# Patient Record
Sex: FEMALE | Race: WHITE
[De-identification: names, ages, dates, MRNs, and addresses within clinical notes are randomized per-mention and may not be internally consistent; named-entity substitution may affect disease eponyms.]

---

## 2017-12-14 ENCOUNTER — HOSPITAL ENCOUNTER (EMERGENCY)
Dept: HOSPITAL 25 - ED | Age: 42
Discharge: HOME | End: 2017-12-14
Payer: MEDICARE

## 2017-12-14 DIAGNOSIS — R11.0: ICD-10-CM

## 2017-12-14 DIAGNOSIS — R10.9: Primary | ICD-10-CM

## 2017-12-14 LAB
BASOPHILS # BLD AUTO: 0.2 10^3/UL (ref 0–0.2)
EOSINOPHIL # BLD AUTO: 0.1 10^3/UL (ref 0–0.6)
HCT VFR BLD AUTO: 41 % (ref 35–47)
HGB BLD-MCNC: 14 G/DL (ref 12–16)
LYMPHOCYTES # BLD AUTO: 2.8 10^3/UL (ref 1–4.8)
MCH RBC QN AUTO: 30 PG (ref 27–31)
MCHC RBC AUTO-ENTMCNC: 34 G/DL (ref 31–36)
MCV RBC AUTO: 87 FL (ref 80–97)
MONOCYTES # BLD AUTO: 0.4 10^3/UL (ref 0–0.8)
NEUTROPHILS # BLD AUTO: 3.9 10^3/UL (ref 1.5–7.7)
NRBC # BLD AUTO: 0 10^3/UL
NRBC BLD QL AUTO: 0.1
PLATELET # BLD AUTO: 250 10^3/UL (ref 150–450)
RBC # BLD AUTO: 4.73 10^6/UL (ref 4–5.4)
WBC # BLD AUTO: 7.4 10^3/UL (ref 3.5–10.8)

## 2017-12-14 PROCEDURE — 83690 ASSAY OF LIPASE: CPT

## 2017-12-14 PROCEDURE — 99282 EMERGENCY DEPT VISIT SF MDM: CPT

## 2017-12-14 PROCEDURE — 85025 COMPLETE CBC W/AUTO DIFF WBC: CPT

## 2017-12-14 PROCEDURE — 36415 COLL VENOUS BLD VENIPUNCTURE: CPT

## 2017-12-14 PROCEDURE — 80053 COMPREHEN METABOLIC PANEL: CPT

## 2017-12-14 PROCEDURE — 74020: CPT

## 2017-12-14 NOTE — RAD
Indication: Chronic increasing abdominal pain.



Comparison: July 20, 2005



Technique: Supine and upright views of the abdomen.



Report: Obese body habitus limits image quality.



No radiographic evidence for free air.



Unremarkable bowel gas pattern. Small volume of stool in the colon without significant

rectal distension.



Negative for suspicious calcifications.



Unremarkable soft tissue contours accounting for obese body habitus.



IMPRESSION:  No acute abdominal pelvic pathologic process evident.

## 2018-01-04 NOTE — ED
Christiana MARRERO Edward, scribed for Osman Cooper MD on 12/14/17 at 1326 .





Abdominal Pain/Female





- HPI Summary


HPI Summary: 





41 y/o female presents to the ED c/o severe ABD pain for weeks. The pain is 

described as a stabbing pain. The pain is located in the RLQ and radiates to 

the upper ABD. When the pt stands up the pain is aggravated. Associated sx: 

nausea, chills, numbness in her hands, bilateral pedal edema. Pt states the 

last time she passed gas was around 2 nights ago. Denies fever. PMHx bipolar 

disorder, PTSD. Sx 2 C-sections. Pt was seen at [facility] recently and had an 

ABD CT done. Pt states it showed kidney stones and multiple ABD hernias.





- History of Current Complaint


Chief Complaint: EDAbdPain


Stated Complaint: ABD PAIN


Hx Obtained From: Patient


Timing: Constant


Severity Currently: Severe


Pain Intensity: 8


Pain Scale Used: 0-10 Numeric


Location: Discrete At: RLQ


Radiates: Yes


Radiates to: Other - upper ABD


Character: Other: - stabbing


Aggravating Factor(s): Food, Movement - standing up


Alleviating Factor(s): Nothing


Associated Signs and Symptoms: Positive: Decreased Appetite, Nausea, Other: - 

numbness in hands, edema in feet


Allergies/Adverse Reactions: 


 Allergies











Allergy/AdvReac Type Severity Reaction Status Date / Time


 


No Known Allergies Allergy   Verified 12/14/17 13:44














PMH/Surg Hx/FS Hx/Imm Hx


Previously Healthy: No


Neurological History: 


   Denies: Hx Seizures


Psychiatric History: Reports: Hx Post Traumatic Stress Disorder, Hx Bipolar 

Disorder





- Immunization History


Date of Tetanus Vaccine: UTD


Date of Influenza Vaccine: 10/2017


Infectious Disease History: No


Infectious Disease History: 


   Denies: Traveled Outside the [place] in Last 30 Days





- Family History


Known Family History: 


   Negative: Cardiac Disease, Hypertension, Diabetes





- Social History


Lives: With Family


Alcohol Use: Occasionally


Hx Substance Use: No


Substance Use Type: Reports: None, Prescribed


Hx Tobacco Use: No


Smoking Status (MU): Never Smoked Tobacco





Review of Systems


Positive: Chills


Eyes: Negative


ENT: Negative


Cardiovascular: Negative


Respiratory: Negative


Positive: Abdominal Pain, Nausea


Genitourinary: Negative


Positive: Edema - in feet


Skin: Negative


Positive: Numbness - in hands


Psychological: Normal


All Other Systems Reviewed And Are Negative: Yes





Physical Exam





- Summary


Physical Exam Summary: 





Appearance: Well-appearing, Well-nourished


Skin: Warm, Dry, No rash


Eyes: Normal, PERRL, EOMI, sclera anicteric


ENT: Normal


Neck: Supple, nontender


Respiratory: Clear to auscultation


Cardiovascular: S1, S2, no murmur, no rub, no gallop


Abdomen: Soft, nontender, no organomegaly


Bowel sounds: Present


Musculoskeletal: Normal, Strength/ROM Intact, no edema, pulses symmetrical


Neurological: Normal, A&Ox3, cranial nerves II-XII WNL, follows commands, gait 

not tested, sensation intact to pin and light touch


Psychiatric: affect normal, behavior appropriate, dressed appropriately, 

judgment intact





Triage Information Reviewed: Yes


Vital Signs On Initial Exam: 


 Initial Vitals











Temp Pulse Resp BP Pulse Ox


 


 97.4 F   84   22   142/89   97 


 


 12/14/17 12:56  12/14/17 12:56  12/14/17 12:56  12/14/17 12:56  12/14/17 12:56











Vital Signs Reviewed: Yes





- Teressa Coma Scale


Coma Scale Total: 15





Diagnostics





- Vital Signs


 Vital Signs











  Temp Pulse Resp BP Pulse Ox


 


 12/14/17 12:56  97.4 F  84  22  142/89  97














- Laboratory


Result Diagrams: 


 12/14/17 14:28





 12/14/17 14:28


Lab Statement: Any lab studies that have been ordered have been reviewed, and 

results considered in the medical decision making process.





- Radiology


  ** ABD XR


Xray Interpretation: No Acute Changes - No acute abdominal pelvic pathologic 

process evident.


Radiology Interpretation Completed By: Radiologist - ED PHYSICIAN REVIEWS AND 

AGREES





Abdominal Pain Fem Course/Dx





- Course


Course Of Treatment: ABD XR NEGATIVE. LABS WITHOUT SIGNIFICANT ABNORMALITIES. 

PT WILL BE D/C HOME.





- Diagnoses


Provider Diagnoses: 


 Nonspecific abdominal pain








Discharge





- Discharge Plan


Condition: Stable


Disposition: HOME


Patient Education Materials:  Abdominal Pain (ED)


Referrals: 


[facility] PHYSICIAN REFERRAL [Outside]





The documentation as recorded by the Christiana martins Edward accurately reflects the 

service I personally performed and the decisions made by me, Osman Cooper MD.

## 2019-12-27 ENCOUNTER — HOSPITAL ENCOUNTER (INPATIENT)
Dept: HOSPITAL 25 - MED | Age: 44
LOS: 6 days | Discharge: HOME | DRG: 872 | End: 2020-01-02
Attending: INTERNAL MEDICINE | Admitting: INTERNAL MEDICINE
Payer: MEDICARE

## 2019-12-27 DIAGNOSIS — E66.9: ICD-10-CM

## 2019-12-27 DIAGNOSIS — E87.1: ICD-10-CM

## 2019-12-27 DIAGNOSIS — L03.116: ICD-10-CM

## 2019-12-27 DIAGNOSIS — D50.0: ICD-10-CM

## 2019-12-27 DIAGNOSIS — Z79.899: ICD-10-CM

## 2019-12-27 DIAGNOSIS — Z99.89: ICD-10-CM

## 2019-12-27 DIAGNOSIS — F31.9: ICD-10-CM

## 2019-12-27 DIAGNOSIS — F43.10: ICD-10-CM

## 2019-12-27 DIAGNOSIS — F42.9: ICD-10-CM

## 2019-12-27 DIAGNOSIS — E87.6: ICD-10-CM

## 2019-12-27 DIAGNOSIS — A41.9: Primary | ICD-10-CM

## 2019-12-27 DIAGNOSIS — F60.3: ICD-10-CM

## 2019-12-27 DIAGNOSIS — L97.429: ICD-10-CM

## 2019-12-27 DIAGNOSIS — G47.30: ICD-10-CM

## 2019-12-27 DIAGNOSIS — R06.02: ICD-10-CM

## 2019-12-27 DIAGNOSIS — R60.0: ICD-10-CM

## 2019-12-27 DIAGNOSIS — G62.9: ICD-10-CM

## 2019-12-27 LAB
ANION GAP SERPL CALC-SCNC: 9 MMOL/L (ref 2–11)
BASOPHILS # BLD AUTO: 0 10^3/UL (ref 0–0.2)
BUN SERPL-MCNC: 9 MG/DL (ref 6–24)
BUN/CREAT SERPL: 13.2 (ref 8–20)
CALCIUM SERPL-MCNC: 6.9 MG/DL (ref 8.6–10.3)
CHLORIDE SERPL-SCNC: 101 MMOL/L (ref 101–111)
EOSINOPHIL # BLD AUTO: 0.1 10^3/UL (ref 0–0.6)
GLUCOSE SERPL-MCNC: 133 MG/DL (ref 70–100)
HCO3 SERPL-SCNC: 22 MMOL/L (ref 22–32)
HCT VFR BLD AUTO: 34 % (ref 35–47)
HGB BLD-MCNC: 11.7 G/DL (ref 12–16)
LYMPHOCYTES # BLD AUTO: 0.5 10^3/UL (ref 1–4.8)
MCH RBC QN AUTO: 31 PG (ref 27–31)
MCHC RBC AUTO-ENTMCNC: 34 G/DL (ref 31–36)
MCV RBC AUTO: 90 FL (ref 80–97)
MONOCYTES # BLD AUTO: 0.2 10^3/UL (ref 0–0.8)
NEUTROPHILS # BLD AUTO: 10.3 10^3/UL (ref 1.5–7.7)
NRBC # BLD AUTO: 0 10^3/UL
NRBC BLD QL AUTO: 0
PLATELET # BLD AUTO: 140 10^3/UL (ref 150–450)
POTASSIUM SERPL-SCNC: 3.3 MMOL/L (ref 3.5–5)
RBC # BLD AUTO: 3.81 10^6 /UL (ref 3.7–4.87)
SODIUM SERPL-SCNC: 132 MMOL/L (ref 135–145)
WBC # BLD AUTO: 11.1 10^3/UL (ref 3.5–10.8)

## 2019-12-27 PROCEDURE — 82565 ASSAY OF CREATININE: CPT

## 2019-12-27 PROCEDURE — 82728 ASSAY OF FERRITIN: CPT

## 2019-12-27 PROCEDURE — 86140 C-REACTIVE PROTEIN: CPT

## 2019-12-27 PROCEDURE — 36415 COLL VENOUS BLD VENIPUNCTURE: CPT

## 2019-12-27 PROCEDURE — 83605 ASSAY OF LACTIC ACID: CPT

## 2019-12-27 PROCEDURE — 83550 IRON BINDING TEST: CPT

## 2019-12-27 PROCEDURE — 80202 ASSAY OF VANCOMYCIN: CPT

## 2019-12-27 PROCEDURE — 94660 CPAP INITIATION&MGMT: CPT

## 2019-12-27 PROCEDURE — 85025 COMPLETE CBC W/AUTO DIFF WBC: CPT

## 2019-12-27 PROCEDURE — 82550 ASSAY OF CK (CPK): CPT

## 2019-12-27 PROCEDURE — 87205 SMEAR GRAM STAIN: CPT

## 2019-12-27 PROCEDURE — 82803 BLOOD GASES ANY COMBINATION: CPT

## 2019-12-27 PROCEDURE — C8929 TTE W OR WO FOL WCON,DOPPLER: HCPCS

## 2019-12-27 PROCEDURE — 93306 TTE W/DOPPLER COMPLETE: CPT

## 2019-12-27 PROCEDURE — 80048 BASIC METABOLIC PNL TOTAL CA: CPT

## 2019-12-27 PROCEDURE — 87040 BLOOD CULTURE FOR BACTERIA: CPT

## 2019-12-27 PROCEDURE — 82607 VITAMIN B-12: CPT

## 2019-12-27 PROCEDURE — 83036 HEMOGLOBIN GLYCOSYLATED A1C: CPT

## 2019-12-27 PROCEDURE — 85027 COMPLETE CBC AUTOMATED: CPT

## 2019-12-27 PROCEDURE — 80053 COMPREHEN METABOLIC PANEL: CPT

## 2019-12-27 PROCEDURE — 36600 WITHDRAWAL OF ARTERIAL BLOOD: CPT

## 2019-12-27 PROCEDURE — 82272 OCCULT BLD FECES 1-3 TESTS: CPT

## 2019-12-27 PROCEDURE — 87070 CULTURE OTHR SPECIMN AEROBIC: CPT

## 2019-12-27 PROCEDURE — 83540 ASSAY OF IRON: CPT

## 2019-12-27 PROCEDURE — 84520 ASSAY OF UREA NITROGEN: CPT

## 2019-12-27 PROCEDURE — 82746 ASSAY OF FOLIC ACID SERUM: CPT

## 2019-12-27 RX ADMIN — TRAMADOL HYDROCHLORIDE PRN MG: 50 TABLET, FILM COATED ORAL at 14:53

## 2019-12-27 RX ADMIN — SODIUM CHLORIDE SCH MLS/HR: 900 IRRIGANT IRRIGATION at 20:25

## 2019-12-27 RX ADMIN — PANTOPRAZOLE SODIUM SCH: 40 TABLET, DELAYED RELEASE ORAL at 17:30

## 2019-12-27 RX ADMIN — SODIUM CHLORIDE SCH MLS/HR: 900 IRRIGANT IRRIGATION at 14:38

## 2019-12-27 RX ADMIN — PIPERACILLIN AND TAZOBACTAM SCH MLS/HR: 3; .375 INJECTION, POWDER, LYOPHILIZED, FOR SOLUTION INTRAVENOUS; PARENTERAL at 20:26

## 2019-12-27 RX ADMIN — CLONAZEPAM SCH MG: 1 TABLET ORAL at 21:22

## 2019-12-27 RX ADMIN — ACETAMINOPHEN PRN MG: 325 TABLET ORAL at 14:38

## 2019-12-27 RX ADMIN — TRAZODONE HYDROCHLORIDE SCH MG: 50 TABLET ORAL at 21:24

## 2019-12-27 RX ADMIN — CALCIUM SCH MG: 500 TABLET ORAL at 21:24

## 2019-12-27 RX ADMIN — ENOXAPARIN SODIUM SCH MG: 40 INJECTION SUBCUTANEOUS at 14:38

## 2019-12-27 RX ADMIN — VANCOMYCIN HYDROCHLORIDE SCH MLS/HR: 1 INJECTION, POWDER, LYOPHILIZED, FOR SOLUTION INTRAVENOUS at 16:33

## 2019-12-27 RX ADMIN — ESCITALOPRAM OXALATE SCH MG: 10 TABLET ORAL at 21:25

## 2019-12-27 NOTE — HP
HISTORY AND PHYSICAL:

 

DATE OF ADMISSION:  12/27/19

 

PRIMARY CARE PROVIDER:  None.

 

REASON FOR THE ADMISSION:  Cellulitis, sent from [facility].

 

HISTORY OF PRESENT ILLNESS:  This is a 44-year-old female, who presented to the 
hospital from [facility] for the treatment of left leg cellulitis.  
About 4 days ago, the patient started to feel sick, which she describes as fever
, malaise, and generalized fatigue.  Shortly thereafter, she noted that she is 
having a left leg blistering, after which she noted severe erythema, swelling, 
and pain.  This was also associated with subjective fevers at home.  She went 
to the emergency room at [facility] and was found to have fever of 102.2, lactic 
acid of 4.1, WBC count of 11.82 and a lactic acid of 4.1.  She was admitted 
with a diagnosis of left leg cellulitis and started on Zosyn.  This morning, 
they noted that the infection had worsened and started her on vancomycin, and 
called the transfer center for transfer to [facility].  Currently, 
the patient is in our hospital, admitted, reports that her left leg is getting 
worse, associated with more pain and erythema, and more blisters.  She is also 
feeling febrile with chills.

 

PAST MEDICAL HISTORY:  Includes PTSD, bipolar disorder, OCD, borderline 
personality disorder.

 

PAST SURGICAL HISTORY:  Includes ventral hernia surgery.

 

ALLERGIES:  MORPHINE.

 

HOME MEDICATIONS:  Include:

1.  Clonazepam 2 mg at bedtime.

2.  Escitalopram 10 mg at bedtime.

3.  Oxcarbazepine 150 mg daily.

4.  Trazodone 50 mg at bedtime.

 

FAMILY HISTORY:  Mother had cancer.

 

SOCIAL HISTORY:  She lives at home with her 2 kids.  She does not smoke, no 
alcohol use.

 

REVIEW OF SYSTEMS:  She is having fever, chills, malaise.  She does not have 
any sore throat.  No changes in her vision or hearing.  She does not have any 
chest pain.  No palpitations.  No paroxysmal nocturnal dyspnea.  She does not 
have any shortness of breath.  No cough.  She does not have any abdominal pain.
  No nausea, no vomiting, no diarrhea.  Extremities:  See HPI.  Skin:  See HPI.
  She does not have any headaches, no changes in her mentation.  She does have 
a history of PTSD, bipolar disorder with no acute changes.  She does not have 
any thoughts of hurting herself or anyone else.

 

                               PHYSICAL EXAMINATION

 

GENERAL:  This is an obese female, well developed, well nourished, lying in bed
, in no acute distress.

 

VITAL SIGNS:  Blood pressure here is 132/88, heart rate of 118, temperature of 
102.1, respiratory rate of 14, saturation of 91% on on room air.

 

HEENT:  Pupils are equal, round, and reactive.  Atraumatic, normocephalic.  
There is no oropharyngeal erythema, no tonsillar exudate.

 

NECK:  There is no cervical lymphadenopathy.  There is no thyromegaly.

 

LUNGS:  There is no lower extremity edema.  Diminished breath sounds at the 
lung bases, with no wheezing, rales or rhonchi.

 

HEART:  There is no chest wall tenderness, regular tachycardia with no murmurs, 
rubs or gallops.

 

ABDOMEN:  Surgical scar at the vertical midline noted.  There is normoactive 
bowel sounds.  Abdomen is soft, nontender, nondistended.

 

EXTREMITIES:  There is no right lower extremity edema.  Left lower extremity: 
There is a marker-line drawn, which contains erythema up to the knee area.  
There is marked erythema with mild swelling and tenderness present with few 
blisters present.  There is also erythema at the left groin.

 

NEUROLOGIC:  She is awake, alert, oriented x3 with no focal deficit.

 

 DIAGNOSTIC STUDIES/LAB DATA:  Labs:  White blood cell count of 11.1, 
hemoglobin of 11.7, hematocrit of 34, platelets of 140.  Absolute neutrophil 
count of 10.3, lactic acid of 1.6.  Sodium of 132, potassium of 3.3, chloride 
of 101, bicarb of 22, BUN of 9, creatinine of 0.068.

 

IMPRESSION AND PLAN:

1.  Sepsis with left lower extremity cellulitis.  At this point, I have started 
the patient on vancomycin, Zosyn.  We will obtain blood cultures.  Start the 
patient on IV fluids.  I have ordered left lower extremity Doppler's to rule 
out DVT.  Pain control has been ordered with acetaminophen as well as tramadol 
as needed.

2.  Anemia.  Hemoglobin of 11.7 with MCV of 90.  This could be heme dilutional, 
repeat blood work will be ordered, I will also start the patient on 
pantoprazole for precautionary purposes.

3.  Hypokalemia.  Repletion has been ordered with 40 mEq of KCl.

4.  Mild hyponatremia with sodium of 133.  This is going to be replenished with 
normal saline.

5.  History of borderline personality disorder, posttraumatic stress disorder, 
bipolar disorder, obsessive-compulsive disorder.  Resume her home medication.

6.  DVT prophylaxis with Lovenox 40 mg subcu.

 

Additional management into the course as the hospital course progresses.

 

 

 

[id number]/CPS #: [id number]

MTDD

## 2019-12-28 LAB
ALBUMIN SERPL BCG-MCNC: 3 G/DL (ref 3.2–5.2)
ALBUMIN SERPL BCG-MCNC: 3 G/DL (ref 3.2–5.2)
ALBUMIN/GLOB SERPL: 1.1 {RATIO} (ref 1–3)
ALBUMIN/GLOB SERPL: 1.1 {RATIO} (ref 1–3)
ALP SERPL-CCNC: 48 U/L (ref 34–104)
ALP SERPL-CCNC: 49 U/L (ref 34–104)
ALT SERPL W P-5'-P-CCNC: 25 U/L (ref 7–52)
ALT SERPL W P-5'-P-CCNC: 27 U/L (ref 7–52)
ANION GAP SERPL CALC-SCNC: 7 MMOL/L (ref 2–11)
ANION GAP SERPL CALC-SCNC: 8 MMOL/L (ref 2–11)
AST SERPL-CCNC: 27 U/L (ref 13–39)
AST SERPL-CCNC: 34 U/L (ref 13–39)
BASOPHILS # BLD AUTO: 0 10^3/UL (ref 0–0.2)
BUN SERPL-MCNC: 5 MG/DL (ref 6–24)
BUN SERPL-MCNC: 6 MG/DL (ref 6–24)
BUN/CREAT SERPL: 8.1 (ref 8–20)
BUN/CREAT SERPL: 9.4 (ref 8–20)
CALCIUM SERPL-MCNC: 7.1 MG/DL (ref 8.6–10.3)
CALCIUM SERPL-MCNC: 7.2 MG/DL (ref 8.6–10.3)
CHLORIDE SERPL-SCNC: 103 MMOL/L (ref 101–111)
CHLORIDE SERPL-SCNC: 104 MMOL/L (ref 101–111)
EOSINOPHIL # BLD AUTO: 0.1 10^3/UL (ref 0–0.6)
FERRITIN SERPL IA-MCNC: 476.3 NG/ML (ref 11–307)
FOLATE SERPL-MCNC: > 20 NG/ML (ref 3.99–?)
GLOBULIN SER CALC-MCNC: 2.8 G/DL (ref 2–4)
GLOBULIN SER CALC-MCNC: 2.8 G/DL (ref 2–4)
GLUCOSE SERPL-MCNC: 130 MG/DL (ref 70–100)
GLUCOSE SERPL-MCNC: 133 MG/DL (ref 70–100)
HCO3 SERPL-SCNC: 21 MMOL/L (ref 22–32)
HCO3 SERPL-SCNC: 23 MMOL/L (ref 22–32)
HCT VFR BLD AUTO: 33 % (ref 35–47)
HCT VFR BLD AUTO: 33 % (ref 35–47)
HGB BLD-MCNC: 11 G/DL (ref 12–16)
HGB BLD-MCNC: 11.3 G/DL (ref 12–16)
IRON SERPL-MCNC: < 20 UG/DL (ref 50–212)
LYMPHOCYTES # BLD AUTO: 0.6 10^3/UL (ref 1–4.8)
MCH RBC QN AUTO: 31 PG (ref 27–31)
MCH RBC QN AUTO: 31 PG (ref 27–31)
MCHC RBC AUTO-ENTMCNC: 34 G/DL (ref 31–36)
MCHC RBC AUTO-ENTMCNC: 34 G/DL (ref 31–36)
MCV RBC AUTO: 90 FL (ref 80–97)
MCV RBC AUTO: 91 FL (ref 80–97)
MONOCYTES # BLD AUTO: 0.2 10^3/UL (ref 0–0.8)
NEUTROPHILS # BLD AUTO: 8.2 10^3/UL (ref 1.5–7.7)
NRBC # BLD AUTO: 0 10^3/UL
NRBC BLD QL AUTO: 0
PLATELET # BLD AUTO: 127 10^3/UL (ref 150–450)
PLATELET # BLD AUTO: 130 10^3/UL (ref 150–450)
POTASSIUM SERPL-SCNC: 3.3 MMOL/L (ref 3.5–5)
POTASSIUM SERPL-SCNC: 3.5 MMOL/L (ref 3.5–5)
PROT SERPL-MCNC: 5.8 G/DL (ref 6.4–8.9)
PROT SERPL-MCNC: 5.8 G/DL (ref 6.4–8.9)
RBC # BLD AUTO: 3.58 10^6 /UL (ref 3.7–4.87)
RBC # BLD AUTO: 3.66 10^6 /UL (ref 3.7–4.87)
SODIUM SERPL-SCNC: 133 MMOL/L (ref 135–145)
SODIUM SERPL-SCNC: 133 MMOL/L (ref 135–145)
TIBC SERPL-MCNC: 256 MCG/DL (ref 250–450)
TRANSFERRIN SERPL-MCNC: 183 MG/DL (ref 203–362)
WBC # BLD AUTO: 9.2 10^3/UL (ref 3.5–10.8)
WBC # BLD AUTO: 9.3 10^3/UL (ref 3.5–10.8)

## 2019-12-28 RX ADMIN — PIPERACILLIN AND TAZOBACTAM SCH MLS/HR: 3; .375 INJECTION, POWDER, LYOPHILIZED, FOR SOLUTION INTRAVENOUS; PARENTERAL at 20:02

## 2019-12-28 RX ADMIN — VANCOMYCIN HYDROCHLORIDE SCH MLS/HR: 1 INJECTION, POWDER, LYOPHILIZED, FOR SOLUTION INTRAVENOUS at 02:07

## 2019-12-28 RX ADMIN — ACETAMINOPHEN PRN MG: 325 TABLET ORAL at 08:04

## 2019-12-28 RX ADMIN — PIPERACILLIN AND TAZOBACTAM SCH MLS/HR: 3; .375 INJECTION, POWDER, LYOPHILIZED, FOR SOLUTION INTRAVENOUS; PARENTERAL at 12:21

## 2019-12-28 RX ADMIN — VANCOMYCIN HYDROCHLORIDE SCH MLS/HR: 1 INJECTION, POWDER, LYOPHILIZED, FOR SOLUTION INTRAVENOUS at 17:04

## 2019-12-28 RX ADMIN — TRAMADOL HYDROCHLORIDE PRN MG: 50 TABLET, FILM COATED ORAL at 00:49

## 2019-12-28 RX ADMIN — ESCITALOPRAM OXALATE SCH MG: 10 TABLET ORAL at 20:03

## 2019-12-28 RX ADMIN — VANCOMYCIN HYDROCHLORIDE SCH MLS/HR: 1 INJECTION, POWDER, LYOPHILIZED, FOR SOLUTION INTRAVENOUS at 08:04

## 2019-12-28 RX ADMIN — TRAZODONE HYDROCHLORIDE SCH MG: 50 TABLET ORAL at 20:03

## 2019-12-28 RX ADMIN — ENOXAPARIN SODIUM SCH MG: 40 INJECTION SUBCUTANEOUS at 15:22

## 2019-12-28 RX ADMIN — IBUPROFEN PRN MG: 400 TABLET ORAL at 17:15

## 2019-12-28 RX ADMIN — PIPERACILLIN AND TAZOBACTAM SCH MLS/HR: 3; .375 INJECTION, POWDER, LYOPHILIZED, FOR SOLUTION INTRAVENOUS; PARENTERAL at 04:02

## 2019-12-28 RX ADMIN — VANCOMYCIN HYDROCHLORIDE SCH MLS/HR: 1 INJECTION, POWDER, LYOPHILIZED, FOR SOLUTION INTRAVENOUS at 23:42

## 2019-12-28 RX ADMIN — ACETAMINOPHEN PRN MG: 325 TABLET ORAL at 00:50

## 2019-12-28 RX ADMIN — CALCIUM SCH MG: 500 TABLET ORAL at 08:05

## 2019-12-28 RX ADMIN — IRON SUCROSE SCH MLS/HR: 20 INJECTION, SOLUTION INTRAVENOUS at 12:21

## 2019-12-28 RX ADMIN — PANTOPRAZOLE SODIUM SCH MG: 40 TABLET, DELAYED RELEASE ORAL at 08:05

## 2019-12-28 RX ADMIN — CLONAZEPAM SCH MG: 1 TABLET ORAL at 20:02

## 2019-12-28 RX ADMIN — ACETAMINOPHEN PRN MG: 325 TABLET ORAL at 15:22

## 2019-12-28 RX ADMIN — OXCARBAZEPINE SCH MG: 300 TABLET, FILM COATED ORAL at 08:05

## 2019-12-28 NOTE — PN
Subjective


Date of Service: 12/28/19


Interval History: 





Nurse reports that she is having difficulty feeling the Left Dorsalis pedis 

pulse, but she was able to hear it with the dopplers.





Patient reports the erythema is imprving at the groin and inner thigh region, 

but the blisters are bigger at the lower leg and there is more swelling.





She reports bilateral feet numbness


Family History: Unchanged from Admission


Social History: Unchanged from Admission


Past Medical History: Unchanged from Admission





Objective


Active Medications: 








Acetaminophen (Tylenol Tab*)  650 mg PO Q6H PRN


   PRN Reason: PAIN - MODERATE


   Last Admin: 12/28/19 08:04 Dose:  650 mg


Clonazepam (Klonopin Tab(*))  2 mg PO BEDTIME [facility]


   Last Admin: 12/27/19 21:22 Dose:  2 mg


Enoxaparin Sodium (Lovenox(*))  40 mg SUBCUT Q24H [facility]


   Last Admin: 12/27/19 14:38 Dose:  40 mg


Escitalopram Oxalate (Lexapro *)  10 mg PO BEDTIME [facility]


   Last Admin: 12/27/19 21:25 Dose:  10 mg


Piperacillin Sod/Tazobactam (Sod 3.375 gm/ Sodium Chloride)  100 mls @ 25 mls/

hr IVPB Q8H [facility]


   Last Admin: 12/28/19 04:02 Dose:  25 mls/hr


Vancomycin HCl 1,250 mg/ (Sodium Chloride)  250 mls @ 166.667 mls/hr IVPB Q8H 

[facility]


   Last Admin: 12/28/19 08:04 Dose:  166.667 mls/hr


Ondansetron HCl (Zofran Inj*)  4 mg IV Q6H PRN


   PRN Reason: NAUSEA


   Last Admin: 12/27/19 15:47 Dose:  4 mg


Oxcarbazepine (Trileptal Tab(*))  150 mg PO DAILY [facility]


   Last Admin: 12/28/19 08:05 Dose:  150 mg


Pantoprazole Sodium (Protonix Tab*)  40 mg PO DAILY [facility]


   Last Admin: 12/28/19 08:05 Dose:  40 mg


Pharmacy Consult (Vancomycin Per Pharmacy*)  1 note FOLLOW UP .VANC PER 

PHARMACY [facility]; Protocol


Pharmacy Profile Note (Vancomycin Trough Check)  1 note FOLLOW UP ONCE ONE


   Stop: 12/28/19 15:31


Tramadol HCl (Ultram*)  50 mg PO Q8H PRN


   PRN Reason: PAIN - SEVERE


   Last Admin: 12/28/19 00:49 Dose:  50 mg


Trazodone HCl (Desyrel Tab*)  50 mg PO BEDTIME [facility]


   Last Admin: 12/27/19 21:24 Dose:  50 mg








 Vital Signs - 8 hr











  12/28/19 12/28/19 12/28/19





  02:45 03:08 03:31


 


Temperature   99 F


 


Pulse Rate   98


 


Respiratory 20 20 20





Rate   


 


Blood Pressure   131/71





(mmHg)   


 


O2 Sat by Pulse   99





Oximetry   














  12/28/19 12/28/19





  06:00 08:00


 


Temperature 98.9 F 100.5 F


 


Pulse Rate 102 110


 


Respiratory 18 24





Rate  


 


Blood Pressure 124/60 120/71





(mmHg)  


 


O2 Sat by Pulse 95 94





Oximetry  











Oxygen Devices in Use Now: None


Appearance: Obese female lying in bed, ill appearing but not in distress


Eyes: PERRLA


Respiratory: - - diminshed breath sounds with mild crackles.


Cardiovascular: - - regular tachycardia, no murmurs.


Abdominal: NL Sounds; No Tenderness; No Distention, No Hepatosplenomegaly


Skin: - - 1+ edema bilateral lower extremities. Left inner thigh and groin with 

mild erythema, and the lower left leg there is severe erythmea with tenderness 

and bilsters present. DP diminshed at the Left leg. sensation to touch at 

bilateral feet is diminshed, but feet are warm with able to move the feet and 

wiggle toes. 


Neurological: Alert and Oriented x 3


Result Diagrams: 


 12/28/19 06:44





 12/28/19 06:44





Assess/Plan/Problems-Billing


Assessment: 











- Patient Problems


(1) Sepsis due to cellulitis


Current Visit: Yes   Status: Acute   Code(s): L03.90 - CELLULITIS, UNSPECIFIED; 

A41.9 - SEPSIS, UNSPECIFIED ORGANISM   SNOMED Code(s): 35698822


   Comment: Left leg cellulitis


Blood cultures were done at [facility]- called them today and they 

reported the cultures are negative thus far


continue IV vancomycin and zosyn


blood cultures were drawn here as well.


DVT was ruled out.


received IV fluids, now having crackles at the lungs and worsening lower 

extremity swelling- stop the IV fluids, one dose of bumex   





(2) Anemia, iron deficiency


Current Visit: Yes   Status: Acute   Code(s): D50.9 - IRON DEFICIENCY ANEMIA, 

UNSPECIFIED   SNOMED Code(s): 50593293


   Comment: Low hemoglobin.


folate and B12 are WNL


iron studies suggestive of iron deficiency


IV iron while inpatient, and then will discharge with oral iron 


at this point no acute bleeding


reports brown colored stool, will get stool for occult blood.   





(3) Hyponatremia


Current Visit: Yes   Status: Acute   Code(s): E87.1 - HYPO-OSMOLALITY AND 

HYPONATREMIA   SNOMED Code(s): 33441450


   Comment: mild hyponatremia


will monitor


received IV fluids, now findings suggestive of fluid overload, IV fluids 

stopped and giving 1mg IV of bumex   





(4) Hypokalemia


Current Visit: Yes   Status: Acute   Code(s): E87.6 - HYPOKALEMIA   SNOMED Code(

s): 73140095


   Comment: repleted   





(5) Obesity


Current Visit: Yes   Status: Acute   Code(s): E66.9 - OBESITY, UNSPECIFIED   

SNOMED Code(s): 927892137


   Comment: BMI of 49


will check A1c

## 2019-12-29 LAB
ANION GAP SERPL CALC-SCNC: 7 MMOL/L (ref 2–11)
BUN SERPL-MCNC: 5 MG/DL (ref 6–24)
BUN/CREAT SERPL: 10.6 (ref 8–20)
CALCIUM SERPL-MCNC: 6.8 MG/DL (ref 8.6–10.3)
CHLORIDE SERPL-SCNC: 111 MMOL/L (ref 101–111)
CK SERPL-CCNC: 50 U/L (ref 10–223)
GLUCOSE SERPL-MCNC: 115 MG/DL (ref 70–100)
HCO3 SERPL-SCNC: 22 MMOL/L (ref 22–32)
HCT VFR BLD AUTO: 27 % (ref 35–47)
HGB BLD-MCNC: 9.3 G/DL (ref 12–16)
MCH RBC QN AUTO: 31 PG (ref 27–31)
MCHC RBC AUTO-ENTMCNC: 34 G/DL (ref 31–36)
MCV RBC AUTO: 91 FL (ref 80–97)
PLATELET # BLD AUTO: 140 10^3/UL (ref 150–450)
POTASSIUM SERPL-SCNC: 2.9 MMOL/L (ref 3.5–5)
RBC # BLD AUTO: 3.02 10^6 /UL (ref 3.7–4.87)
SODIUM SERPL-SCNC: 140 MMOL/L (ref 135–145)
WBC # BLD AUTO: 8 10^3/UL (ref 3.5–10.8)

## 2019-12-29 RX ADMIN — ENOXAPARIN SODIUM SCH MG: 40 INJECTION SUBCUTANEOUS at 16:20

## 2019-12-29 RX ADMIN — IBUPROFEN PRN MG: 400 TABLET ORAL at 16:33

## 2019-12-29 RX ADMIN — TRAZODONE HYDROCHLORIDE SCH MG: 50 TABLET ORAL at 20:38

## 2019-12-29 RX ADMIN — ACETAMINOPHEN PRN MG: 325 TABLET ORAL at 08:14

## 2019-12-29 RX ADMIN — PIPERACILLIN AND TAZOBACTAM SCH MLS/HR: 3; .375 INJECTION, POWDER, LYOPHILIZED, FOR SOLUTION INTRAVENOUS; PARENTERAL at 04:53

## 2019-12-29 RX ADMIN — VANCOMYCIN HYDROCHLORIDE SCH MLS/HR: 1 INJECTION, POWDER, LYOPHILIZED, FOR SOLUTION INTRAVENOUS at 16:20

## 2019-12-29 RX ADMIN — Medication SCH TAB: at 20:38

## 2019-12-29 RX ADMIN — PIPERACILLIN AND TAZOBACTAM SCH MLS/HR: 3; .375 INJECTION, POWDER, LYOPHILIZED, FOR SOLUTION INTRAVENOUS; PARENTERAL at 12:39

## 2019-12-29 RX ADMIN — VANCOMYCIN HYDROCHLORIDE SCH MLS/HR: 1 INJECTION, POWDER, LYOPHILIZED, FOR SOLUTION INTRAVENOUS at 04:54

## 2019-12-29 RX ADMIN — PIPERACILLIN AND TAZOBACTAM SCH MLS/HR: 3; .375 INJECTION, POWDER, LYOPHILIZED, FOR SOLUTION INTRAVENOUS; PARENTERAL at 20:38

## 2019-12-29 RX ADMIN — VANCOMYCIN HYDROCHLORIDE SCH MLS/HR: 1 INJECTION, POWDER, LYOPHILIZED, FOR SOLUTION INTRAVENOUS at 23:06

## 2019-12-29 RX ADMIN — ESCITALOPRAM OXALATE SCH MG: 10 TABLET ORAL at 20:38

## 2019-12-29 RX ADMIN — OXCARBAZEPINE SCH MG: 300 TABLET, FILM COATED ORAL at 08:15

## 2019-12-29 RX ADMIN — PANTOPRAZOLE SODIUM SCH MG: 40 TABLET, DELAYED RELEASE ORAL at 08:15

## 2019-12-29 RX ADMIN — IRON SUCROSE SCH MLS/HR: 20 INJECTION, SOLUTION INTRAVENOUS at 08:14

## 2019-12-29 RX ADMIN — VANCOMYCIN HYDROCHLORIDE SCH MLS/HR: 1 INJECTION, POWDER, LYOPHILIZED, FOR SOLUTION INTRAVENOUS at 10:58

## 2019-12-29 RX ADMIN — CLONAZEPAM SCH MG: 1 TABLET ORAL at 20:38

## 2019-12-29 NOTE — PN
Subjective


Date of Service: 12/29/19


Interval History: 





No acute issues overnight


Had fever yesterday.


This morning seen and evaluated- reported no new issues


having occasional shortness of breath, does have pain at the left leg.


Family History: Unchanged from Admission


Social History: Unchanged from Admission


Past Medical History: Unchanged from Admission





Objective


Active Medications: 








Acetaminophen (Tylenol Tab*)  650 mg PO Q6H PRN


   PRN Reason: PAIN - MODERATE


   Last Admin: 12/29/19 08:14 Dose:  650 mg


Clonazepam (Klonopin Tab(*))  2 mg PO BEDTIME [facility]


   Last Admin: 12/28/19 20:02 Dose:  2 mg


Enoxaparin Sodium (Lovenox(*))  40 mg SUBCUT Q24H [facility]


   Last Admin: 12/28/19 15:22 Dose:  40 mg


Escitalopram Oxalate (Lexapro *)  10 mg PO BEDTIME [facility]


   Last Admin: 12/28/19 20:03 Dose:  10 mg


Piperacillin Sod/Tazobactam (Sod 3.375 gm/ Sodium Chloride)  100 mls @ 25 mls/

hr IVPB Q8H [facility]


   Last Admin: 12/29/19 04:53 Dose:  25 mls/hr


Iron Sucrose 200 mg/ Sodium (Chloride)  110 mls @ 110 mls/hr IVPB DAILY [facility]


   Stop: 01/01/20 09:59


   Last Admin: 12/29/19 08:14 Dose:  110 mls/hr


Vancomycin HCl 1,000 mg/ (Sodium Chloride)  250 mls @ 166.667 mls/hr IV Q6H [facility]


   Last Admin: 12/29/19 10:58 Dose:  166.667 mls/hr


Ibuprofen (Motrin Tab*)  400 mg PO Q6H PRN


   PRN Reason: MILD PAIN or TEMP > 100.4


   Last Admin: 12/28/19 17:15 Dose:  400 mg


Ondansetron HCl (Zofran Inj*)  4 mg IV Q6H PRN


   PRN Reason: NAUSEA


   Last Admin: 12/27/19 15:47 Dose:  4 mg


Oxcarbazepine (Trileptal Tab(*))  150 mg PO DAILY [facility]


   Last Admin: 12/29/19 08:15 Dose:  150 mg


Pantoprazole Sodium (Protonix Tab*)  40 mg PO DAILY [facility]


   Last Admin: 12/29/19 08:15 Dose:  40 mg


Pharmacy Consult (Vancomycin Per Pharmacy*)  1 note FOLLOW UP .VANC PER 

PHARMACY [facility]; Protocol


Pharmacy Consult (Vancomycin Random Level*)  1 note FOLLOW UP ONCE ONE


   Stop: 12/30/19 05:01


Potassium Chloride (Klor Con Er Tab*)  40 meq PO UC ONCE ONE


   Stop: 12/29/19 13:01


Tramadol HCl (Ultram*)  50 mg PO Q8H PRN


   PRN Reason: PAIN - SEVERE


   Last Admin: 12/28/19 00:49 Dose:  50 mg


Trazodone HCl (Desyrel Tab*)  50 mg PO BEDTIME [facility]


   Last Admin: 12/28/19 20:03 Dose:  50 mg








 Vital Signs - 8 hr











  12/29/19 12/29/19 12/29/19





  07:00 08:00 11:00


 


Temperature 98.7 F  98.5 F


 


Pulse Rate 103  95


 


Respiratory 18 18 20





Rate   


 


Blood Pressure 127/77  126/86





(mmHg)   


 


O2 Sat by Pulse 94  95





Oximetry   











Oxygen Devices in Use Now: None


Appearance: Obese female lying in bed, not in distress.


Ears/Nose/Mouth/Throat: Mucous Membranes Moist


Respiratory: Symmetrical Chest Expansion and Respiratory Effort, - - mild 

bibasilar wheezing.


Cardiovascular: NL Sounds; No Murmurs; No JVD, RRR


Extremities: - - 1+ edema bilateral lower extremities.


Skin: - - Left groin with mild erythema, moderate erythema and Left lower leg 

with blisters, swelling and tenderness. Dorsalis pedis 2+


Neurological: Alert and Oriented x 3, NL Muscle Strength and Tone


Result Diagrams: 


 12/29/19 05:49





 12/29/19 05:48


Microbiology and Other Data: 


 Microbiology











 12/28/19 06:44 Aerobic Blood Culture - Preliminary





 Blood Venous    No Growth Day 1





 Anaerobic Blood Culture - Preliminary





    No Growth Day 1


 


 12/28/19 20:25 Stool Occult Blood ([facility]) - Final





 Stool 














Assess/Plan/Problems-Billing


Assessment: 











- Patient Problems


(1) Sepsis due to cellulitis


Current Visit: Yes   Status: Acute   Code(s): L03.90 - CELLULITIS, UNSPECIFIED; 

A41.9 - SEPSIS, UNSPECIFIED ORGANISM   SNOMED Code(s): 04691640


   Comment: Left leg cellulitis


Blood cultures were done at [facility]- called them today and they 

reported the cultures are negative thus far


continue IV vancomycin and zosyn


blood cultures were drawn here as well.


DVT was ruled out.


   





(2) Anemia, iron deficiency


Current Visit: Yes   Status: Acute   Code(s): D50.9 - IRON DEFICIENCY ANEMIA, 

UNSPECIFIED   SNOMED Code(s): 28667825


   Comment: Low hemoglobin.


folate and B12 are WNL


iron studies suggestive of iron deficiency


IV iron while inpatient, and then will discharge with oral iron 


at this point no acute bleeding


reports brown colored stool, stool occult is negative.   





(3) Hyponatremia


Current Visit: Yes   Status: Acute   Code(s): E87.1 - HYPO-OSMOLALITY AND 

HYPONATREMIA   SNOMED Code(s): 81832326


   Comment: mild hyponatremia


will monitor


received IV fluids, now findings suggestive of fluid overload, IV fluids 

stopped and giving 1mg IV of bumex   





(4) Hypokalemia


Current Visit: Yes   Status: Acute   Code(s): E87.6 - HYPOKALEMIA   SNOMED Code(

s): 61759915


   Comment: repleted 12/28, 12/29


   





(5) Obesity


Current Visit: Yes   Status: Acute   Code(s): E66.9 - OBESITY, UNSPECIFIED   

SNOMED Code(s): 002730074


   Comment: BMI of 49


A1c is WNL.   





(6) Leg swelling


Current Visit: Yes   Status: Acute   Code(s): M79.89 - OTHER SPECIFIED SOFT 

TISSUE DISORDERS   SNOMED Code(s): 512136106


   Comment: Leg swelling and trouble brething after IV fluids- yesterday 

received one dose bumex wtih improvement.


Will check ECHO


   





(7) Sleep apnea


Current Visit: Yes   Status: Acute   Code(s): G47.30 - SLEEP APNEA, UNSPECIFIED

   SNOMED Code(s): 24537852


   Comment: on Night CPAP

## 2019-12-30 RX ADMIN — TRAMADOL HYDROCHLORIDE PRN MG: 50 TABLET, FILM COATED ORAL at 05:59

## 2019-12-30 RX ADMIN — VANCOMYCIN HYDROCHLORIDE SCH MLS/HR: 1 INJECTION, POWDER, LYOPHILIZED, FOR SOLUTION INTRAVENOUS at 06:10

## 2019-12-30 RX ADMIN — CLONAZEPAM SCH MG: 1 TABLET ORAL at 20:16

## 2019-12-30 RX ADMIN — ESCITALOPRAM OXALATE SCH MG: 10 TABLET ORAL at 20:17

## 2019-12-30 RX ADMIN — PANTOPRAZOLE SODIUM SCH MG: 40 TABLET, DELAYED RELEASE ORAL at 08:10

## 2019-12-30 RX ADMIN — Medication SCH TAB: at 20:16

## 2019-12-30 RX ADMIN — TRAZODONE HYDROCHLORIDE SCH MG: 50 TABLET ORAL at 20:17

## 2019-12-30 RX ADMIN — Medication SCH TAB: at 08:10

## 2019-12-30 RX ADMIN — TRAMADOL HYDROCHLORIDE PRN MG: 50 TABLET, FILM COATED ORAL at 16:36

## 2019-12-30 RX ADMIN — VANCOMYCIN HYDROCHLORIDE SCH MLS/HR: 1 INJECTION, POWDER, LYOPHILIZED, FOR SOLUTION INTRAVENOUS at 08:09

## 2019-12-30 RX ADMIN — VANCOMYCIN HYDROCHLORIDE SCH: 1 INJECTION, POWDER, LYOPHILIZED, FOR SOLUTION INTRAVENOUS at 12:43

## 2019-12-30 RX ADMIN — IBUPROFEN PRN MG: 400 TABLET ORAL at 08:06

## 2019-12-30 RX ADMIN — CEFTRIAXONE SODIUM SCH MLS/HR: 1 INJECTION, POWDER, FOR SOLUTION INTRAVENOUS at 16:36

## 2019-12-30 RX ADMIN — PIPERACILLIN AND TAZOBACTAM SCH MLS/HR: 3; .375 INJECTION, POWDER, LYOPHILIZED, FOR SOLUTION INTRAVENOUS; PARENTERAL at 04:03

## 2019-12-30 RX ADMIN — OXCARBAZEPINE SCH MG: 300 TABLET, FILM COATED ORAL at 08:09

## 2019-12-30 RX ADMIN — PIPERACILLIN AND TAZOBACTAM SCH MLS/HR: 3; .375 INJECTION, POWDER, LYOPHILIZED, FOR SOLUTION INTRAVENOUS; PARENTERAL at 12:22

## 2019-12-30 RX ADMIN — IRON SUCROSE SCH MLS/HR: 20 INJECTION, SOLUTION INTRAVENOUS at 12:22

## 2019-12-30 NOTE — ECHO
*[facility]*

[place] [address]

Phone: [phone]

Fax #: [phone]



-------------------------------------------------------------------

Transthoracic Echocardiogram



Patient: Arti Monique                 MRN:        [id number]

:     1975                         Study Date: 2019

Age:     44                                 Accession#: [id number]

Gender:  F                                  HR:         82 bpm

Height:  64 in /162.6 cm                    BSA:        2.28 m^2

Weight:  286.4 lb /130.2 kg                 BMI:        49.3 kg/m^2



*Sonographer: * Sera Cooper [facility]

 

*Referring Physician: * Kelly Saez

*Reading Physician: * Tariq Berumen MD



-------------------------------------------------------------------

Indications:   SOB.



-------------------------------------------------------------------

History:   Risk factors:  Obese.



-------------------------------------------------------------------

Conclusions



Summary:



- Left ventricle: The cavity size is normal. Wall thickness is

  mildly increased. Systolic function is normal. The estimated

  ejection fraction is 55-60%. Wall motion is normal; there are no

  regional wall motion abnormalities.

- Right ventricle: The cavity size is normal. Systolic function is

  normal.

- Left atrium: The atrium is mildly dilated.

- Aortic root: The aortic root is mildly dilated.

- Ascending aorta: The ascending aorta is mildly dilated.

- Pulmonary arteries: Systolic pressure is within the normal range.

- No significant valvular abnormalities noted.



Recommendations:  None prior for comparison at time of

interpretation.



-------------------------------------------------------------------

Study data:  Transthoracic echocardiogram.  Procedure:

Transthoracic echocardiography was performed. Image quality was

suboptimal. The study was technically limited due to body habitus.

Intravenous Definity , 4.5 mlswas administered.  Complete 2D,

spectral Doppler, and color flow Doppler.  Location:  Bedside.

Patient status:  Inpatient. Patient room number: 411-02.  Rhythm:

Normal sinus rhythm.



-------------------------------------------------------------------

Findings



Left ventricle:  The cavity size is normal. Wall thickness is

mildly increased. Systolic function is normal. The estimated

ejection fraction is 55-60%. Wall motion is normal; there are no

regional wall motion abnormalities. Left ventricular diastolic

function parameters are normal.

Right ventricle:  The cavity size is normal. Systolic function is

normal.

Left atrium:  The atrium is mildly dilated.

Right atrium:  The atrium is mildly dilated.

Mitral valve:  The leaflets are mildly thickened.  There is no

evidence of stenosis.   There is trace to mild regurgitation.

Aortic valve:   The valve is trileaflet. The leaflets are normal

thickness.  There is no evidence of stenosis.   There is trace

regurgitation.

Tricuspid valve:  The leaflets are normal thickness.  There is no

evidence of stenosis.   There is mild regurgitation.

Pulmonic valve:   The leaflets are normal thickness.  There is no

evidence of stenosis.   There is trace to mild regurgitation.

Aorta:  Aortic root: The aortic root is mildly dilated.

Ascending aorta: The ascending aorta is mildly dilated.

Aortic arch: The aortic arch is appears normal.

Pericardium:  A prominent pericardial fat pad is present. There is

no significant pericardial effusion.

Pulmonary arteries:

The main pulmonary artery is normal-sized. Systolic pressure is

within the normal range.

Systemic veins:

Inferior vena cava: The vessel is dilated. There is (&lt; 50%)

respiratory change in the IVC dimension.



-------------------------------------------------------------------

Measurements



 Left ventricle            Value        Ref         Aortic valve              Value       Ref

 JEAN CARLOS, LAX                  4.7   cm     3.8 - 5.2   Kamaljit diam, ED              2.1   cm    -----

 ESD, LAX                  3.0   cm     2.2 - 3.5   Peak v, S                 1.41  m/sec -----

 FS, LAX                   36    %      27 - 45     VTI, S                    27.2  cm    -----

 PW, ED, LAX       (H)     1.3   cm     0.6 - 0.9   Mean grad, S              4.0   mm Hg -----

 FS                        36    %      27 - 45     Peak grad, S              8.0   mm Hg -----

 PW, ED            (H)     1.3   cm     0.6 - 0.9   LVOT/AV, VTI ratio        0.88        -----

 E&apos;, lat kamaljit, TDI          12.0  cm/sec &gt;=10.0

 E/e&apos;, lat kamaljit,            10           ----------  Mitral valve              Value       Ref

 TDI                                                Peak E                    1.14  m/sec -----

 E&apos;, med kamaljit, TDI          14.0  cm/sec &gt;=7.0       Peak A                    0.55  m/sec ---
--

 E/e&apos;, med kamaljit,            8            ----------  Decel time                169   ms    -----

 TDI                                                Peak grad, D              5.2   mm Hg -----

 E&apos;, avg, TDI              13.0  cm/sec ----------  Peak E/A ratio            2.1         -----

 E/e&apos;, avg, TDI            9            &lt;=14

                                                    Pulmonic valve            Value       Ref

 LVOT                      Value        Ref         Peak v, S                 1     m/sec -----

 Peak waleska, S               1.22  m/sec  ----------  Peak grad, S              4.0   mm Hg -----

 VTI, S                    24.0  cm     ----------

 Peak grad, S              6     mm Hg  ----------  Tricuspid valve           Value       Ref

 Mean grad, S              4     mm Hg  ----------  TR peak v                 2.51  m/sec &lt;=2.8

                                                    Peak RV-RA grad, S        25    mm Hg -----

 Ventricular septum        Value        Ref

 IVS, ED           (H)     1.2   cm     0.6 - 0.9   Aortic root               Value       Ref

                                                    Root diam                 3.7   cm    &lt;4.3

 Right ventricle           Value        Ref

 JEAN CARLOS, LAX                  3.1   cm     ----------  Ascending aorta           Value       Ref

 JEAN CARLOS minor ax, A4C (H)     3.7   cm     1.9 - 3.5   AAo AP diam, S            3.6   cm    -----

 mid

 Pressure, S               40    mm Hg  ----------  Aortic arch               Value       Ref

                                                    Arch diam                 2.6   cm    -----

 Left atrium               Value        Ref

 AP dim, ES                3.70  cm     2.70 -      Decending aorta           Value       Ref

                                        3.80        Vinh peak waleska              0.92  m/sec -----

 ML dim, A4C               4.4   cm     ----------

 SI dim, A4C               5.8   cm     ----------  Pulmonary artery          Value       Ref

 Vol/bsa, ES, 1-p          25    ml/m^2 11 - 40     Pressure, S               36.0  mm Hg -----

 A4C

 Vol/bsa, ES, A/L          30    ml/m^2 16 - 34     Inferior vena cava        Value       Ref

                                                    Diam                      2.3   cm    -----

 Right atrium              Value        Ref

 SI dim, ES        (H)     5.6   cm     3.4 - 5.3

 ML dim, ES, A4C           3.4   cm     2.6 - 4.4

 Estimated RAP             15    mm Hg  ----------

 

Legend:

(L)  and  (H)  marni values outside specified reference range.



Prepared and electronically signed by



Tariq Berumen MD

2019 15:17

## 2019-12-30 NOTE — CONSULT
Subjective


Date of Service: 12/30/19


Interval History: 





Ms. Monique is a 44-year-old female with past medical history significant for 

peripheral neuropathy, PTSD, bipolar disorder, OCD, and borderline personality 

disorder, who presented to [facility] Emergency Room for complaints of 

redness to her left leg. Due to worsening of the redness in her leg, she was 

transferred [place] [facility] [place]. 





She presented to the hospital with bullae and rash to her left leg and an ulcer 

to the left plantar heel. This has been present for at least 3 weeks, a friend 

at the [facility] had instructed her to treat the wound with iodosorb. 





Patient seen and examined at bedside. Verbal consent obtained for a wound 

evaluation and photograph. 





Family History: Unchanged from Admission


Social History: Unchanged from Admission


Past Medical History: Unchanged from Admission





Review of Systems





- Measurements


Intake and Output: 


Intake and Output Last 24 Hours











 12/28/19 12/29/19 12/30/19 12/31/19





 06:59 06:59 06:59 06:59


 


Intake Total 1815 6897 2827 480


 


Output Total 1300 7600 2900 300


 


Balance 515 -703 -73 180


 


Weight 287 lb   


 


Intake:    


 


  IV Fluids 515 2197 300 


 


    ABX - VANCOMYCIN 280   


 


    NS (0.9%) 235 2197 300 


 


  IVPB  1150 1127 


 


    ABX - VANCOMYCIN  782 806 


 


    Iron  115 115 


 


    Zosyn  253 206 


 


  Oral 1300 3550 1400 480


 


Output:    


 


  Urine 1300 7600 2900 300


 


Other:    


 


  Estimated Void   Medium 


 


  # Bowel Movements  1  


 


  Estimated Stool Amount  Large  














- Review of Systems


Constitutional Symptoms: Positive: Fever, Other - Chills


Dermatology: Positive: Rash, Other - blisters to left leg, wound to the left 

heel





Objective


Active Medications: 





Acetaminophen (Tylenol Tab*)  650 mg PO Q6H PRN Reason: PAIN - MODERATE


Clonazepam (Klonopin Tab(*))  2 mg PO BEDTIME [facility]


Escitalopram Oxalate (Lexapro *)  10 mg PO BEDTIME [facility]


Piperacillin Sod/Tazobactam (Sod 3.375 gm/ Sodium Chloride)  100 mls @ 25 mls/

hr IVPB Q8H [facility]


Iron Sucrose 200 mg/ Sodium (Chloride)  110 mls @ 110 mls/hr IVPB DAILY [facility]


   Stop: 01/01/20 09:59


Vancomycin HCl 1,000 mg/ (Sodium Chloride)  250 mls @ 166.667 mls/hr IV Q6H [facility]


Ibuprofen (Motrin Tab*)  400 mg PO Q6H PRN Reason: MILD PAIN or TEMP > 100.4


Lactobacillus Rhamnosus (Lactobacillus Acidophilus*)  1 tab PO BID [facility]


Ondansetron HCl (Zofran Inj*)  4 mg IV Q6H PRN Reason: NAUSEA


Oxcarbazepine (Trileptal Tab(*))  150 mg PO DAILY [facility]


Pantoprazole Sodium (Protonix Tab*)  40 mg PO DAILY [facility]


Pharmacy Consult (Vancomycin Per Pharmacy*)  1 note FOLLOW UP .VANC PER 

PHARMACY [facility]; Protocol


Tramadol HCl (Ultram*)  50 mg PO Q8H PRN Reason: PAIN - SEVERE


Trazodone HCl (Desyrel Tab*)  50 mg PO BEDTIME [facility]





         Vital Signs 











  12/30/19 12/30/19 12/30/19





  05:59 08:00 11:13


 


Temperature  101.4 F 98.4 F


 


Pulse Rate  98 81


 


Respiratory 20 16 16





Rate   


 


Blood Pressure  134/92 101/58





(mmHg)   


 


O2 Sat by Pulse  96 95





Oximetry   








Oxygen Devices in Use Now: None


Appearance: NAD, sitting up in bed


Ears/Nose/Mouth/Throat: Mucous Membranes Moist


Respiratory: Symmetrical Chest Expansion and Respiratory Effort


Extremities: - - 1-2+ bilateral LE edema L>R, 1+ bilateral DP pulse


Skin: - - See skin note below. There is slight erythema to the left medial 

thigh.


Neurological: Alert and Oriented x 3


Nutrition: Taking PO's


Result Diagrams: 


 12/31/19 05:59





 01/01/20 07:48


Additional Lab and Data: 





 Above labs were pulled into the note, when the note was edited prior to 

signing. Please see below for labs from day of consultation.





 Laboratory Tests











  12/28/19 12/29/19 12/29/19





  06:44 05:48 05:48


 


WBC   


 


Hgb   


 


Hct   


 


Plt Count   


 


Sodium   140 


 


Potassium   2.9 L 


 


Chloride   111 


 


Carbon Dioxide   22 


 


BUN   5 L 


 


Creatinine   0.47 L 


 


Glucose   115 H 


 


Hemoglobin A1c    5.9 H


 


C-Reactive Protein  402.18 H  


 


Total Protein  5.8 L  


 


Albumin  3.0 L  














  12/29/19





  05:49


 


WBC  8.0


 


Hgb  9.3 L


 


Hct  27 L


 


Plt Count  140 L


 


Sodium 


 


Potassium 


 


Chloride 


 


Carbon Dioxide 


 


BUN 


 


Creatinine 


 


Glucose 


 


Hemoglobin A1c 


 


C-Reactive Protein 


 


Total Protein 


 


Albumin 











Diagnostic Imaging: 





Exam Date: 12/27/19 1419 - VL LOWER EXT VEINS LEFT





IMPRESSION: NO EVIDENCE OF DEEP VENOUS THROMBOSIS IS IDENTIFIED. LIMITED 

EVALUATION OF THE DISTAL FEMORAL VEIN, POSTERIOR TIBIAL VEINS AND PERONEAL 

VEINS.








Skin Deviation Note





- Skin Deviation Findings





Left heel, plantar aspect - There is an area of dry necrotic tissue, measures 

0.3 cm x 0.7 cm x 0.2 cm. The surrounding skin is intact, but feels calloused. 

There is no erythema or drainage. 


Left lower leg - There is weeping edema with multiple large bullae. There are 3 

superficial open areas. There is an area to the medial aspect of the leg (not 

visualized in the picture), measures 1 cm x 3 cm x 0.1 cm. The wound base is 

red epithelial tissue. The surrounding skin is erythematous. There is serous 

drainage. The proximal open area, measures 2 cm x 1.5 cm x 0.2 cm. The wound 

base is red epithelial tissue. The surrounding skin is erythematous. There is a 

large amount of serous drainage. The distal open area, measures 1.5 cm x 2 cm x 

0.1 cm. The wound base is red epithelial tissue. The surrounding skin is 

erythematous. There is a moderate amount of serous drainage.








Wound Problem/Plan


Assessment: 


Ms. Monique is a 44-year-old female with past medical history significant for 

peripheral neuropathy, PTSD, bipolar disorder, OCD, and borderline personality 

disorder, who presented to [facility] Emergency Room for complaints of 

redness to her left leg. Due to worsening of the redness in her leg, she was 

transferred to [facility] [place]. She presented to the hospital with 

bullae and rash to her left leg and an ulcer to the left plantar heel. 





1. Ulcer to the plantar aspect of the left heel. Suspect this is a neuropathic 

ulcer vs trauma. Pt noticed bleeding from the area about 3 weeks ago, but 

denies known trauma. Recommend keeping the area clean, no dressing needed at 

this time. She should follow with Podiatry and/or the wound clinic at discharge 

as she may benefit from debridement of the wound. Should have ABIs once there 

is improvement in the cellulitis of the left LE, she would not tolerate ABIs at 

this time due to pain in the left leg. If this wound becomes a chronic wound, 

should also consider obtaining an MRI to evaluate for osteomyelitis.


2. Left LE cellulitis with bullae. Currently on IV ABX per ID. There is weeping 

of serous drainage. Recommend washing the leg gently with soap and water. Keep 

open to air. Keep leg elevated as able to help with edema.


3. Peripheral neuropathy. Unclear cause, she is not a diabetic. 


4. Diet. Regular diet.


5. Code Status. Full Code Status. 


6. Disposition. Inpatient, disposition per primary medicine team.





TIME SPENT: Time for this wound consultation was 20 minutes and 10 minutes was 

spent with the patient discussing past medical history; assessing, measuring, 

and photographing the wound; and discussing wound care recommendations with the 

patient. 





Is Patient a Wound Clinic Patient: No


Attending: Mirna Adrian

## 2019-12-30 NOTE — PN
Subjective


Date of Service: 12/30/19


Interval History: 





No acute issues overnight


Did have fever yesterday


She has no chest pain this morning, no shortness of breath, no palpitations.


Family History: Unchanged from Admission


Social History: Unchanged from Admission


Past Medical History: Unchanged from Admission





Objective


Active Medications: 








Acetaminophen (Tylenol Tab*)  650 mg PO Q6H PRN


   PRN Reason: PAIN - MODERATE


   Last Admin: 12/29/19 08:14 Dose:  650 mg


Clonazepam (Klonopin Tab(*))  2 mg PO BEDTIME [facility]


   Last Admin: 12/29/19 20:38 Dose:  2 mg


Escitalopram Oxalate (Lexapro *)  10 mg PO BEDTIME [facility]


   Last Admin: 12/29/19 20:38 Dose:  10 mg


Piperacillin Sod/Tazobactam (Sod 3.375 gm/ Sodium Chloride)  100 mls @ 25 mls/

hr IVPB Q8H [facility]


   Last Admin: 12/30/19 04:03 Dose:  25 mls/hr


Iron Sucrose 200 mg/ Sodium (Chloride)  110 mls @ 110 mls/hr IVPB DAILY [facility]


   Stop: 01/01/20 09:59


   Last Admin: 12/29/19 08:14 Dose:  110 mls/hr


Vancomycin HCl 1,000 mg/ (Sodium Chloride)  250 mls @ 166.667 mls/hr IV Q6H [facility]


   Last Admin: 12/30/19 08:09 Dose:  166.667 mls/hr


Ibuprofen (Motrin Tab*)  400 mg PO Q6H PRN


   PRN Reason: MILD PAIN or TEMP > 100.4


   Last Admin: 12/30/19 08:06 Dose:  400 mg


Lactobacillus Rhamnosus (Lactobacillus Acidophilus*)  1 tab PO BID [facility]


   Last Admin: 12/30/19 08:10 Dose:  1 tab


Ondansetron HCl (Zofran Inj*)  4 mg IV Q6H PRN


   PRN Reason: NAUSEA


   Last Admin: 12/27/19 15:47 Dose:  4 mg


Oxcarbazepine (Trileptal Tab(*))  150 mg PO DAILY [facility]


   Last Admin: 12/30/19 08:09 Dose:  150 mg


Pantoprazole Sodium (Protonix Tab*)  40 mg PO DAILY [facility]


   Last Admin: 12/30/19 08:10 Dose:  40 mg


Pharmacy Consult (Vancomycin Per Pharmacy*)  1 note FOLLOW UP .VANC PER 

PHARMACY [facility]; Protocol


Tramadol HCl (Ultram*)  50 mg PO Q8H PRN


   PRN Reason: PAIN - SEVERE


   Last Admin: 12/30/19 05:59 Dose:  50 mg


Trazodone HCl (Desyrel Tab*)  50 mg PO BEDTIME [facility]


   Last Admin: 12/29/19 20:38 Dose:  50 mg








 Vital Signs - 8 hr











  12/30/19 12/30/19 12/30/19





  03:16 05:59 08:00


 


Temperature 99.2 F  101.4 F


 


Pulse Rate 95  98


 


Respiratory 20 20 16





Rate   


 


Blood Pressure 113/78  134/92





(mmHg)   


 


O2 Sat by Pulse 94  96





Oximetry   











Oxygen Devices in Use Now: None


Appearance: Obese female lying in bed, not in distress


Eyes: PERRLA


Ears/Nose/Mouth/Throat: Mucous Membranes Moist


Respiratory: Symmetrical Chest Expansion and Respiratory Effort, Clear to 

Auscultation


Cardiovascular: NL Sounds; No Murmurs; No JVD, RRR


Abdominal: NL Sounds; No Tenderness; No Distention, No Hepatosplenomegaly


Skin: - - Left lower leg with moderate erythema and mild tenderness with 

swelling. Left groin mild erythema present. THere are blisters at the left 

lower leg. Left heel an area of opening with dark discoloaration- small ulcer 

lesion with no drainage or tenderness


Neurological: Alert and Oriented x 3


Result Diagrams: 


 12/29/19 05:49





 12/29/19 05:48


Microbiology and Other Data: 


 Microbiology











 12/28/19 06:44 Aerobic Blood Culture - Preliminary





 Blood Venous    No Growth Day 1





 Anaerobic Blood Culture - Preliminary





    No Growth Day 1


 


 12/28/19 20:25 Stool Occult Blood (SHANNON) - Final





 Stool 














Assess/Plan/Problems-Billing


Assessment: 











- Patient Problems


(1) Sepsis due to cellulitis


Current Visit: Yes   Status: Acute   Code(s): L03.90 - CELLULITIS, UNSPECIFIED; 

A41.9 - SEPSIS, UNSPECIFIED ORGANISM   SNOMED Code(s): 36870470


   Comment: Left leg cellulitis


Blood cultures were done at [facility]- called them today and they 

reported the cultures are negative thus far


continue IV vancomycin and zosyn


blood cultures here are thus far negative.


DVT was ruled out.


   





(2) Anemia, iron deficiency


Current Visit: Yes   Status: Acute   Code(s): D50.9 - IRON DEFICIENCY ANEMIA, 

UNSPECIFIED   SNOMED Code(s): 90498187


   Comment: Low hemoglobin.


folate and B12 are WNL


iron studies suggestive of iron deficiency


IV iron while inpatient, and then will discharge with oral iron 


at this point no acute bleeding


reports brown colored stool, stool occult is negative.


drop in hb this admission- could be dilutional- will monitor.   





(3) Hyponatremia


Current Visit: Yes   Status: Acute   Code(s): E87.1 - HYPO-OSMOLALITY AND 

HYPONATREMIA   SNOMED Code(s): 42381019


   Comment: mild hyponatremia


will monitor


received IV fluids, now findings suggestive of fluid overload, IV fluids 

stopped and status post 1mg IV of bumex 12/28   





(4) Hypokalemia


Current Visit: Yes   Status: Acute   Code(s): E87.6 - HYPOKALEMIA   SNOMED Code(

s): 49625342


   Comment: repleted 12/28, 12/29


   





(5) Obesity


Current Visit: Yes   Status: Acute   Code(s): E66.9 - OBESITY, UNSPECIFIED   

SNOMED Code(s): 490399899


   Comment: BMI of 49


A1c is WNL.   





(6) Leg swelling


Current Visit: Yes   Status: Acute   Code(s): M79.89 - OTHER SPECIFIED SOFT 

TISSUE DISORDERS   SNOMED Code(s): 637245437


   Comment: Leg swelling and trouble brething after IV fluids- yesterday 

received one dose bumex wtih improvement.


Will check ECHO


   





(7) Sleep apnea


Current Visit: Yes   Status: Acute   Code(s): G47.30 - SLEEP APNEA, UNSPECIFIED

   SNOMED Code(s): 70601710


   Comment: on Night CPAP

## 2019-12-30 NOTE — CONS
CONSULTATION REPORT:

 

DATE OF CONSULT:  12/30/19

 

PRIMARY CARE PROVID[facility]  [facility] [place]

 

PROVIDER REQUESTING CONSULTATION:  Dr. Kelly Saez.

 

CONSULTING SERVICE:  Infectious Disease.

 

PROVIDER:  Sera Shin NP.

 

ATTENDING PROVIDER:  Dr. Boo Hernandez* (dictated by Sera Shin NP).

 

REASON FOR CONSULT:  Left lower extremity cellulitis.

 

IMPRESSION:

1.  Left lower extremity cellulitis.  The patient reports that she is having 
some improvement in the erythema and swelling of the left leg overall.  She has 
weeping with multiple large bullae present.  She was also noted to have an area 
of erythema and induration to the left thigh.  Her initial leukocytosis has 
resolved, but she continues to have intermittent fevers with the last fever 
this morning of 101.4. Temperature max during her hospitalization here has been 
102.9 and she has been on vancomycin and Zosyn.  Blood cultures with no growth 
to date.  She did have a venous Doppler done on admission showing no deep 
venous thrombosis. Suspect this likely represents a Group A Step cellulitis. 

2.  Sepsis.  Secondary to item #1.  This was present on admission, now resolved.

3.  Peripheral neuropathy with neuropathic ulcer vs traumatic wound to the 
plantar aspect of the left heel. No signs of infection at this time. 

 

PLAN:

Recommend discontinuing Vancomycin and Zosyn. Start Ceftriaxone 2gm IV daily. 
She should continue to stay in the hospital receiving IV antibiotics until she 
has marked improvement in the leg. We will continue to follow along, further 
recommendations will be made based on the clinic course. 



HISTORY OF PRESENT ILLNESS:  Ms. Monique is a 44-year-old female with past 
medical history significant for PTSD, bipolar disorder, OCD, and borderline 
personality disorder, who presented to [facility] Emergency Room for 
complaints of redness to her left leg.  Ms. Monique states that she had been in 
her usual state of health when on Thursday, 12/26/19, she had noticed some 
erythema on the inner aspect of her left ankle.  She also noticed that she had 
a fever of 105.  Due to this, she had discussed with a friend who had been 
attending to a wound on her left heel, who recommended that she go to the 
emergency room.  Prior to that, for about 4 days, she had felt unwell with 
malaise and general fatigue.  She presented to the [facility] Emergency 
Room where she was found to have a fever of 102.2, a lactic acid of 4.1, white 
blood cell count of 11.82, and she was admitted there for a left lower 
extremity cellulitis and started on Zosyn.  Based on pictures the patient has 
from her hospitalization, at around 9 a.m. on 12/26/19, she had some slight 
erythema to the ankle.  By 3 p.m., this had spread up the leg and was a dark 
erythema.  The patient was noted to have worsening of her infection.  She was 
changed to vancomycin and was transferred to [facility] for further 
treatment.  It is to note that the patient states that approximately 3 weeks 
ago she had noticed blood on her floor and had noticed that she had a wound to 
the plantar aspect of her left heel.  She has been having this treated by a 
friend at the [facility], who has been using Iodosorb on the area.  During her 
hospitalization, she has continued to have intermittent fevers with the last 
fever this morning of 101.4.  Her leukocytosis has resolved.  From labs from 12/
28/19, she was noted to have a CRP of 402.  She continues to report 
intermittent fevers and chills.  She reports feeling as though her stomach is 
off a little bit with some nausea.  Denies vomiting, diarrhea, constipation, 
urinary symptoms, recent travel.  She also endorses some dizziness.  While at 
the hospital, she has been continued on vancomycin and Zosyn.

 

PAST MEDICAL HISTORY:

1.  Posttraumatic stress disorder.

2.  Bipolar disorder.

3.  Obsessive compulsive disorder.

4.  Borderline personality disorder.

 

PAST SURGICAL HISTORY:  Status post ventral hernia repair.

 

MEDICATIONS: Home medications:

1.  Escitalopram 10 mg by mouth daily.

2.  Sertraline 10 mg by mouth daily.

3.  Oxcarbazepine 150 mg t.i.d.

4.  Naproxen 250 mg by mouth twice daily.

5.  Klonopin 0.5 mg by mouth daily.

6.  Trazodone 50 mg by mouth twice daily.

 

Hospital medications:

1.  Acetaminophen 650 mg by mouth every 6 hours as needed for pain.

2.  Clonazepam 2 mg by mouth daily at bedtime.

3.  Lexapro 10 mg by mouth daily at bedtime.

4.  Ibuprofen 400 mg by mouth every 6 hours as needed for mild pain or fever.

5.  Iron sucrose 200 mg IV daily.

6.  Lactobacillus 1 tablet by mouth twice daily.

7.  Zofran 4 mg IV every 6 hours as needed for nausea.

8.  Oxcarbazepine 150 mg by mouth daily.

9.  Protonix 40 mg by mouth daily.

10.  Zosyn 3.375 g IV every 8 hours.

11.  Tramadol 50 mg by mouth every 8 hours as needed for severe pain.

12.  Trazodone 50 mg by mouth at bedtime.

13.  Vancomycin 1000 mg IV every 6 hours.

 

ALLERGIES:  CODEINE, LATEX, MELOXICAM, MORPHINE.

 

FAMILY HISTORY:  Denies family history of recurrent or resistant infections.  
No family history of coronary artery disease.  Mother with a history of diabetes
, history of a pacemaker, and throat and lung cancer.  She was a smoker.

 

SOCIAL HISTORY:  Reports drinking alcohol 1 to 2 times weekly.  Denies tobacco 
or recreational drug use.

 

REVIEW OF SYSTEMS:  I performed a 10-point review of systems.  All the 
pertinent positives and negatives are mentioned in the history of present 
illness.  The remaining review of systems are negative.

 

PHYSICAL EXAM:  Vital Signs:  Temperature 101.4, heart rate 98, respiratory 
rate 16, O2 sat 96% on room air, blood pressure 134/92.  General Appearance:  
The patient is alert, appears to be in no acute distress, sitting up in bed.  
EENT: Extraocular movements are intact.  No subconjunctival hemorrhage.  Moist 
mucous membranes.  Neck:  Supple.  No lymphadenopathy.  Neurological:  Alert 
and oriented x4.  Cranial nerves II through XII are grossly intact.  She moves 
all extremities. Cardiovascular:  Regular rate and rhythm.  S1, S2 present.  No 
murmurs, rubs, or gallops heard.  Respiratory:  Lungs are clear to auscultation 
bilaterally.  There is no accessory muscle use.  Abdomen:  Bowel sounds 
present.  Abdomen is obese, soft, nontender.  Extremities:  There is 2+ 
bilateral lower extremity edema with the left greater than the right.  DP and 
PT pulses are 2+ and symmetric. Musculoskeletal:  No clubbing or cyanosis 
noted.  She exhibits good strength in all extremities.  Psychological:  Calm 
and cooperative.  Skin:  She has dark erythema to the left leg from the ankle 
to the knee.  Additionally, she has some light blanchable erythema to the 
medial aspect of her left thigh with some induration in that area.  She also 
has multiple large bullae to the left leg.  The left leg is warm to touch.  On 
her left heel, on the plantar aspect, there is a callused area. This measures 
approximately 0.3 x 0.7 x 0.1 cm.  It has dry dark eschar.  No drainage.  No 
surrounding erythema.  There is serous drainage from the left lower extremity.

 

DIAGNOSTIC STUDIES/LAB DATA:  Labs from 12/29/19:  Sodium 140, potassium 2.9, 
chloride 111, CO2 of 22, BUN 5, creatinine 0.47, glucose 115.  White blood cell 
count 8.0, hemoglobin 9.3, hematocrit 27, platelet count 140.  Blood cultures 
with no growth on day 2.  Hemoglobin A1c 5.9.

 

Please see impression and recommendations outlined above, recommendations have 
been discussed with Dr. Kelly Saez.

 

The case has been reviewed with my attending Dr. Boo Hernandez, who agrees 
with the plan of care.

 

Reviewed by TANYA RICO-CARMEN  01/02/20  1228

 

[id number]/CPS #: [id number]

[email or web address]

## 2019-12-31 LAB
ANION GAP SERPL CALC-SCNC: 8 MMOL/L (ref 2–11)
BASOPHILS # BLD AUTO: 0 10^3/UL (ref 0–0.2)
BUN SERPL-MCNC: 5 MG/DL (ref 6–24)
BUN/CREAT SERPL: 9.8 (ref 8–20)
CALCIUM SERPL-MCNC: 8 MG/DL (ref 8.6–10.3)
CHLORIDE SERPL-SCNC: 103 MMOL/L (ref 101–111)
EOSINOPHIL # BLD AUTO: 0.2 10^3/UL (ref 0–0.6)
GLUCOSE SERPL-MCNC: 118 MG/DL (ref 70–100)
HCO3 SERPL-SCNC: 25 MMOL/L (ref 22–32)
HCT VFR BLD AUTO: 31 % (ref 35–47)
HGB BLD-MCNC: 10.3 G/DL (ref 12–16)
LYMPHOCYTES # BLD AUTO: 1.3 10^3/UL (ref 1–4.8)
MCH RBC QN AUTO: 30 PG (ref 27–31)
MCHC RBC AUTO-ENTMCNC: 34 G/DL (ref 31–36)
MCV RBC AUTO: 90 FL (ref 80–97)
MONOCYTES # BLD AUTO: 0.5 10^3/UL (ref 0–0.8)
NEUTROPHILS # BLD AUTO: 5 10^3/UL (ref 1.5–7.7)
NRBC # BLD AUTO: 0 10^3/UL
NRBC BLD QL AUTO: 0.1
PLATELET # BLD AUTO: 192 10^3/UL (ref 150–450)
POTASSIUM SERPL-SCNC: 3.5 MMOL/L (ref 3.5–5)
RBC # BLD AUTO: 3.4 10^6 /UL (ref 3.7–4.87)
SODIUM SERPL-SCNC: 136 MMOL/L (ref 135–145)
WBC # BLD AUTO: 7.1 10^3/UL (ref 3.5–10.8)

## 2019-12-31 RX ADMIN — ESCITALOPRAM OXALATE SCH MG: 10 TABLET ORAL at 20:52

## 2019-12-31 RX ADMIN — IRON SUCROSE SCH MLS/HR: 20 INJECTION, SOLUTION INTRAVENOUS at 09:37

## 2019-12-31 RX ADMIN — ENOXAPARIN SODIUM SCH MG: 40 INJECTION SUBCUTANEOUS at 12:27

## 2019-12-31 RX ADMIN — TRAMADOL HYDROCHLORIDE PRN MG: 50 TABLET, FILM COATED ORAL at 04:04

## 2019-12-31 RX ADMIN — TRAZODONE HYDROCHLORIDE SCH MG: 50 TABLET ORAL at 20:52

## 2019-12-31 RX ADMIN — CLONAZEPAM SCH MG: 1 TABLET ORAL at 20:52

## 2019-12-31 RX ADMIN — OXCARBAZEPINE SCH MG: 300 TABLET, FILM COATED ORAL at 09:37

## 2019-12-31 RX ADMIN — TRAMADOL HYDROCHLORIDE PRN MG: 50 TABLET, FILM COATED ORAL at 15:49

## 2019-12-31 RX ADMIN — PANTOPRAZOLE SODIUM SCH MG: 40 TABLET, DELAYED RELEASE ORAL at 09:38

## 2019-12-31 RX ADMIN — IBUPROFEN PRN MG: 400 TABLET ORAL at 09:38

## 2019-12-31 RX ADMIN — Medication SCH TAB: at 09:37

## 2019-12-31 RX ADMIN — CEFTRIAXONE SODIUM SCH MLS/HR: 1 INJECTION, POWDER, FOR SOLUTION INTRAVENOUS at 15:50

## 2019-12-31 RX ADMIN — Medication SCH TAB: at 20:52

## 2019-12-31 NOTE — PN
Subjective


Date of Service: 12/31/19


Interval History: 





No fevers overnight. last fever yesterday at 8AM.


No trouble breathing.


She fears that her left inner thigh will get blisters now, although there is no 

sign of any blisters there, she is worried about it.


Pain at the left leg has improved.


Family History: Unchanged from Admission


Social History: Unchanged from Admission


Past Medical History: Unchanged from Admission





Objective


Active Medications: 








Acetaminophen (Tylenol Tab*)  650 mg PO Q6H PRN


   PRN Reason: PAIN - MODERATE


   Last Admin: 12/29/19 08:14 Dose:  650 mg


Clonazepam (Klonopin Tab(*))  2 mg PO BEDTIME [facility]


   Last Admin: 12/30/19 20:16 Dose:  2 mg


Escitalopram Oxalate (Lexapro *)  10 mg PO BEDTIME [facility]


   Last Admin: 12/30/19 20:17 Dose:  10 mg


Iron Sucrose 200 mg/ Sodium (Chloride)  110 mls @ 110 mls/hr IVPB DAILY [facility]


   Stop: 01/01/20 09:59


   Last Admin: 12/31/19 09:37 Dose:  110 mls/hr


Ceftriaxone Sodium 2 gm/ (Sodium Chloride)  50 mls @ 200 mls/hr IVPB Q24H [facility]


   Last Admin: 12/30/19 16:36 Dose:  200 mls/hr


Ibuprofen (Motrin Tab*)  400 mg PO Q6H PRN


   PRN Reason: MILD PAIN or TEMP > 100.4


   Last Admin: 12/31/19 09:38 Dose:  400 mg


Lactobacillus Rhamnosus (Lactobacillus Acidophilus*)  1 tab PO BID [facility]


   Last Admin: 12/31/19 09:37 Dose:  1 tab


Ondansetron HCl (Zofran Inj*)  4 mg IV Q6H PRN


   PRN Reason: NAUSEA


   Last Admin: 12/27/19 15:47 Dose:  4 mg


Oxcarbazepine (Trileptal Tab(*))  150 mg PO DAILY [facility]


   Last Admin: 12/31/19 09:37 Dose:  150 mg


Pantoprazole Sodium (Protonix Tab*)  40 mg PO DAILY [facility]


   Last Admin: 12/31/19 09:38 Dose:  40 mg


Tramadol HCl (Ultram*)  50 mg PO Q8H PRN


   PRN Reason: PAIN - SEVERE


   Last Admin: 12/31/19 04:04 Dose:  50 mg


Trazodone HCl (Desyrel Tab*)  50 mg PO BEDTIME [facility]


   Last Admin: 12/30/19 20:17 Dose:  50 mg








 Vital Signs - 8 hr











  12/31/19 12/31/19 12/31/19





  04:00 04:03 04:04


 


Temperature 98.8 F  


 


Pulse Rate 97  


 


Respiratory 20 20 18





Rate   


 


Blood Pressure 138/85  





(mmHg)   


 


O2 Sat by Pulse 96  





Oximetry   











Oxygen Devices in Use Now: None


Appearance: Sitting on bed, not in distress. obese


Eyes: PERRLA


Respiratory: Symmetrical Chest Expansion and Respiratory Effort, Clear to 

Auscultation


Cardiovascular: NL Sounds; No Murmurs; No JVD, RRR, No Edema


Skin: - - LLE with mild to moderate erythema with blisters at the lower leg. 

Left inner thigh with minimal erythema. 


Neurological: Alert and Oriented x 3


Result Diagrams: 


 12/31/19 05:59





 12/31/19 05:59


Microbiology and Other Data: 


 Microbiology











 12/28/19 06:44 Aerobic Blood Culture - Preliminary





 Blood Venous    No Growth Day 1





 Anaerobic Blood Culture - Preliminary





    No Growth Day 1


 


 12/28/19 20:25 Stool Occult Blood (SHANNON) - Final





 Stool 














Assess/Plan/Problems-Billing


Assessment: 











- Patient Problems


(1) Sepsis due to cellulitis


Current Visit: Yes   Status: Acute   Code(s): L03.90 - CELLULITIS, UNSPECIFIED; 

A41.9 - SEPSIS, UNSPECIFIED ORGANISM   SNOMED Code(s): 00629961


   Comment: Left leg cellulitis- is improving


Blood cultures were done at [facility]- called them today and they 

reported the cultures are negative thus far


seen by ID, d/c'ed purvi/carmen- received 4 days.


now on rocpehin, today is day 2.


blood cultures here are thus far negative.


DVT was ruled out.


   





(2) Anemia, iron deficiency


Current Visit: Yes   Status: Acute   Code(s): D50.9 - IRON DEFICIENCY ANEMIA, 

UNSPECIFIED   SNOMED Code(s): 59403970


   Comment: Low hemoglobin.


folate and B12 are WNL


iron studies suggestive of iron deficiency


IV iron while inpatient, and then will discharge with oral iron 


at this point no acute bleeding


reports brown colored stool, stool occult is negative.


drop in hb this admission- could be dilutional- will monitor.   





(3) Hyponatremia


Current Visit: Yes   Status: Acute   Code(s): E87.1 - HYPO-OSMOLALITY AND 

HYPONATREMIA   SNOMED Code(s): 32833420


   Comment: mild hyponatremia


will monitor


received IV fluids, now findings suggestive of fluid overload, IV fluids 

stopped and status post 1mg IV of bumex 12/28   





(4) Hypokalemia


Current Visit: Yes   Status: Acute   Code(s): E87.6 - HYPOKALEMIA   SNOMED Code(

s): 47276287


   Comment: repleted 12/28, 12/29


   





(5) Obesity


Current Visit: Yes   Status: Acute   Code(s): E66.9 - OBESITY, UNSPECIFIED   

SNOMED Code(s): 129624689


   Comment: BMI of 49


A1c is WNL.   





(6) Leg swelling


Current Visit: Yes   Status: Acute   Code(s): M79.89 - OTHER SPECIFIED SOFT 

TISSUE DISORDERS   SNOMED Code(s): 237848633


   Comment: improved


ECHO reviewed, no CHF


   





(7) Sleep apnea


Current Visit: Yes   Status: Acute   Code(s): G47.30 - SLEEP APNEA, UNSPECIFIED

   SNOMED Code(s): 76576536


   Comment: on Night CPAP   





(8) DVT prophylaxis


Current Visit: Yes   Status: Acute   Code(s): Z29.9 - ENCOUNTER FOR 

PROPHYLACTIC MEASURES, UNSPECIFIED   SNOMED Code(s): 696866679


   Comment: lovenox subQ started today.


there was anemia- so was holding off.

## 2020-01-01 LAB — BUN SERPL-MCNC: 7 MG/DL (ref 6–24)

## 2020-01-01 RX ADMIN — ESCITALOPRAM OXALATE SCH MG: 10 TABLET ORAL at 21:24

## 2020-01-01 RX ADMIN — Medication SCH TAB: at 09:07

## 2020-01-01 RX ADMIN — TRAMADOL HYDROCHLORIDE PRN MG: 50 TABLET, FILM COATED ORAL at 02:35

## 2020-01-01 RX ADMIN — PANTOPRAZOLE SODIUM SCH MG: 40 TABLET, DELAYED RELEASE ORAL at 09:07

## 2020-01-01 RX ADMIN — Medication SCH TAB: at 21:24

## 2020-01-01 RX ADMIN — TRAMADOL HYDROCHLORIDE PRN MG: 50 TABLET, FILM COATED ORAL at 16:03

## 2020-01-01 RX ADMIN — ENOXAPARIN SODIUM SCH MG: 40 INJECTION SUBCUTANEOUS at 09:08

## 2020-01-01 RX ADMIN — CLONAZEPAM SCH MG: 1 TABLET ORAL at 21:24

## 2020-01-01 RX ADMIN — TRAZODONE HYDROCHLORIDE SCH MG: 50 TABLET ORAL at 21:24

## 2020-01-01 RX ADMIN — OXCARBAZEPINE SCH MG: 300 TABLET, FILM COATED ORAL at 09:07

## 2020-01-01 RX ADMIN — IRON SUCROSE SCH MLS/HR: 20 INJECTION, SOLUTION INTRAVENOUS at 10:19

## 2020-01-01 NOTE — PN
Subjective


Date of Service: 01/01/20


Interval History: 





No acute issues overnight


Now afebrile X 24+ hours.


No chills, continues to have pain at the left inner thigh. 


Family History: Unchanged from Admission


Social History: Unchanged from Admission


Past Medical History: Unchanged from Admission





Objective


Active Medications: 








Acetaminophen (Tylenol Tab*)  650 mg PO Q6H PRN


   PRN Reason: PAIN - MODERATE


   Last Admin: 12/29/19 08:14 Dose:  650 mg


Clonazepam (Klonopin Tab(*))  2 mg PO BEDTIME [facility]


   Last Admin: 12/31/19 20:52 Dose:  2 mg


Enoxaparin Sodium (Lovenox(*))  40 mg SUBCUT Q24H [facility]


   Last Admin: 12/31/19 12:27 Dose:  40 mg


Escitalopram Oxalate (Lexapro *)  10 mg PO BEDTIME [facility]


   Last Admin: 12/31/19 20:52 Dose:  10 mg


Iron Sucrose 200 mg/ Sodium (Chloride)  110 mls @ 110 mls/hr IVPB DAILY [facility]


   Stop: 01/01/20 09:59


   Last Admin: 12/31/19 09:37 Dose:  110 mls/hr


Ceftriaxone Sodium 2 gm/ (Sodium Chloride)  50 mls @ 200 mls/hr IVPB Q24H [facility]


   Last Admin: 12/31/19 15:50 Dose:  200 mls/hr


Ibuprofen (Motrin Tab*)  400 mg PO Q6H PRN


   PRN Reason: MILD PAIN or TEMP > 100.4


   Last Admin: 12/31/19 09:38 Dose:  400 mg


Lactobacillus Rhamnosus (Lactobacillus Acidophilus*)  1 tab PO BID [facility]


   Last Admin: 12/31/19 20:52 Dose:  1 tab


Ondansetron HCl (Zofran Inj*)  4 mg IV Q6H PRN


   PRN Reason: NAUSEA


   Last Admin: 12/27/19 15:47 Dose:  4 mg


Oxcarbazepine (Trileptal Tab(*))  150 mg PO DAILY [facility]


   Last Admin: 12/31/19 09:37 Dose:  150 mg


Pantoprazole Sodium (Protonix Tab*)  40 mg PO DAILY [facility]


   Last Admin: 12/31/19 09:38 Dose:  40 mg


Tramadol HCl (Ultram*)  50 mg PO Q8H PRN


   PRN Reason: PAIN - SEVERE


   Last Admin: 01/01/20 02:35 Dose:  50 mg


Trazodone HCl (Desyrel Tab*)  50 mg PO BEDTIME [facility]


   Last Admin: 12/31/19 20:52 Dose:  50 mg








 Vital Signs - 8 hr











  01/01/20 01/01/20 01/01/20





  02:32 02:35 03:25


 


Temperature   98.8 F


 


Pulse Rate   88


 


Respiratory 16 18 16





Rate   


 


Blood Pressure   106/63





(mmHg)   


 


O2 Sat by Pulse   96





Oximetry   














  01/01/20





  05:40


 


Temperature 


 


Pulse Rate 


 


Respiratory 17





Rate 


 


Blood Pressure 





(mmHg) 


 


O2 Sat by Pulse 





Oximetry 











Oxygen Devices in Use Now: None


Appearance: Obese female sitting on bed, not in distress


Eyes: PERRLA


Ears/Nose/Mouth/Throat: Mucous Membranes Moist


Respiratory: Symmetrical Chest Expansion and Respiratory Effort, Clear to 

Auscultation


Cardiovascular: NL Sounds; No Murmurs; No JVD, RRR


Skin: - - Left inner thigh with mild erythema and mild tenderness, Left lower 

leg with mild to moderate erythema with blisters.


Neurological: Alert and Oriented x 3


Result Diagrams: 


 12/31/19 05:59





 01/01/20 07:48


Microbiology and Other Data: 


 


 Microbiology











 12/30/19 12:30 Gram Stain - Final





 Wound Wound Culture - Final





    Normal Karime


 


 12/28/19 06:44 Aerobic Blood Culture - Preliminary





 Blood Venous    No Growth Day 4





 Anaerobic Blood Culture - Preliminary





    No Growth Day 4


 


 12/28/19 20:25 Stool Occult Blood (SHANNON) - Final





 Stool 














Assess/Plan/Problems-Billing


Assessment: 











- Patient Problems


(1) Sepsis due to cellulitis


Current Visit: Yes   Status: Acute   Code(s): L03.90 - CELLULITIS, UNSPECIFIED; 

A41.9 - SEPSIS, UNSPECIFIED ORGANISM   SNOMED Code(s): 95564655


   Comment: Left leg cellulitis- is improving


Blood cultures were done at [facility]- called them today and they 

reported the cultures are negative thus far


seen by ID, d/c'ed purvi/carmen- received 4 days.


now on rocpehin, today is day 3.


blood cultures here are thus far negative.


DVT was ruled out.


   





(2) Anemia, iron deficiency


Current Visit: Yes   Status: Acute   Code(s): D50.9 - IRON DEFICIENCY ANEMIA, 

UNSPECIFIED   SNOMED Code(s): 20351698


   Comment: Low hemoglobin.


folate and B12 are WNL


iron studies suggestive of iron deficiency


today is day 5 of 5 of IV iron. 


will start oral iron starting tomorrow.


at this point no acute bleeding


reports brown colored stool, stool occult is negative.


drop in hb this admission- could be dilutional- will monitor.   





(3) Hyponatremia


Current Visit: Yes   Status: Acute   Code(s): E87.1 - HYPO-OSMOLALITY AND 

HYPONATREMIA   SNOMED Code(s): 08518951


   Comment: mild hyponatremia


resolved. 


   





(4) Hypokalemia


Current Visit: Yes   Status: Acute   Code(s): E87.6 - HYPOKALEMIA   SNOMED Code(

s): 35361195


   Comment: repleted 12/28, 12/29


   





(5) Obesity


Current Visit: Yes   Status: Acute   Code(s): E66.9 - OBESITY, UNSPECIFIED   

SNOMED Code(s): 066364364


   Comment: BMI of 49


A1c is WNL.   





(6) Leg swelling


Current Visit: Yes   Status: Acute   Code(s): M79.89 - OTHER SPECIFIED SOFT 

TISSUE DISORDERS   SNOMED Code(s): 460210039


   Comment: improved


ECHO reviewed, no CHF


   





(7) Sleep apnea


Current Visit: Yes   Status: Acute   Code(s): G47.30 - SLEEP APNEA, UNSPECIFIED

   SNOMED Code(s): 90348118


   Comment: on Night CPAP   





(8) DVT prophylaxis


Current Visit: Yes   Status: Acute   Code(s): Z29.9 - ENCOUNTER FOR 

PROPHYLACTIC MEASURES, UNSPECIFIED   SNOMED Code(s): 371575751


   Comment: lovenox subQ

## 2020-01-02 VITALS — DIASTOLIC BLOOD PRESSURE: 94 MMHG | SYSTOLIC BLOOD PRESSURE: 138 MMHG

## 2020-01-02 RX ADMIN — ENOXAPARIN SODIUM SCH: 40 INJECTION SUBCUTANEOUS at 08:03

## 2020-01-02 RX ADMIN — TRAMADOL HYDROCHLORIDE PRN MG: 50 TABLET, FILM COATED ORAL at 08:14

## 2020-01-02 RX ADMIN — Medication SCH TAB: at 08:00

## 2020-01-02 RX ADMIN — OXCARBAZEPINE SCH MG: 300 TABLET, FILM COATED ORAL at 07:59

## 2020-01-02 RX ADMIN — PANTOPRAZOLE SODIUM SCH MG: 40 TABLET, DELAYED RELEASE ORAL at 08:00

## 2020-01-02 RX ADMIN — TRAMADOL HYDROCHLORIDE PRN MG: 50 TABLET, FILM COATED ORAL at 00:02

## 2020-01-02 NOTE — DS
CC:  Dr. Kalyan Mendez, Fax #[phone] *

 

DISCHARGE SUMMARY:

 

DATE OF ADMISSION:  12/27/19

 

DATE OF DISCHARGE:  01/02/20

 

REASON FOR THE ADMISSION:  Left leg cellulitis.

 

HOSPITAL COURSE:  This is a 44-year-old female with past medical history of 
mental illness, who was admitted to [facility] because of left leg 
cellulitis.  The patient was subsequently transferred from [facility] to 
[facility] for further treatment and care.  In the hospital, the 
patient was started on IV vancomycin and Zosyn.  An ultrasound of the left 
lower extremity was done to rule out a DVT.  Blood cultures were sent.  Blood 
cultures have remained negative throughout the hospital course.  The patient 
also had a consultation with Infectious Disease, the patient was eventually 
changed from IV vancomycin and Zosyn to IV Rocephin.  The patient has been 
afebrile for the past 24 hours.  Last time the patient had a fever was on 12/30/
19 at 8 o'clock in the morning, it was 101.4 Fahrenheit.

 

The patient's hospital course was complicated by the patient developing 
shortness of breath and lower extremity edema, for which the patient received 
IV Bumex.  For this, we also obtained a transthoracic echocardiogram, which 
ruled out congestive heart failure.  The patient's ejection fraction was 55% to 
60%.

 

The patient was noted to have anemia with iron studies suggestive of iron 
deficiency anemia.  The patient received IV iron x5 days and subsequently we 
will be discharging her on oral iron supplements.  I discussed with the patient 
regarding the need for gastroenterology evaluation.  The patient reports that 
she will discuss it with her primary care provider who will provide with the 
appropriate referrals as she does not want me to refer the patient because [facility] 
not [facility].  The patient would like to have a GI evaluation as 
dictated by her primary care provider.

 

Other etiology for her iron deficiency anemia does include uterine losses as 
the patient reports that she does have a history of heavy menses.

 

The patient also has a left heel wound.  According to the patient, she 
sustained this wound after stepping on her dog.  The wound did not look acutely 
infected. The patient will require additional wound care for the wound that is 
present.  We discussed this with the patient.  The patient reports that she [facility] 
with the [facility] and she would like them to figure out regarding the wound 
care.  This was also discussed with our  and they will work on 
giving all the information that the [facility] requires to set up the wound care.

 

DISCHARGE DIAGNOSES:  Include:

1.  Sepsis secondary to left leg cellulitis.

2.  Anemia of iron deficiency.

3.  Hyponatremia, resolved.

4.  Hypokalemia, resolved.

5.  Obesity with a BMI of 49.

6.  History of sleep apnea, on night CPAP.

7.  Mental illness.

 

PHYSICAL EXAMINATION:  Blood pressure of 138/94, heart rate of 84, temperature 
of 97.3 Fahrenheit, O2 sat of 97% on room air.  General:  This is an obese 
female, lying in bed, in no acute distress.  There is no JVD.  Pupils are equal
, round, reactive to light.  Atraumatic, normocephalic.  Lungs:  There is no 
tachypnea.  No use of accessory muscles.  Lungs are clear without any wheezing, 
rales, or rhonchi. Abdomen:  Normoactive bowel sounds.  Surgical scar noted at 
the midline vertically. There is no tenderness.  Abdomen is soft, nondistended.
  Extremities:  There is no lower extremity edema on the right leg.  Left lower 
leg, there is an area demarcated with the marker, which has mild erythema with 
blisters noted as well as left groin and left inner thigh with mild erythema. 
There is no swelling at the inner groin; however, there is swelling at the left 
lower leg. Left heel with an ulcer with no surrounding erythema, tenderness or 
drainage, has dark discoloration.

 

DISCHARGE PLANNING:

 

DISCHARGE CONDITION:  Fair.

 

DISCHARGE DISPOSITION:  Home.

 

DISCHARGE MEDICATIONS:  Include:

1.  Keflex 500 mg 4 times a day for the next 14 days.

2.  Ferrous sulfate 325 mg daily.

3.  Lactobacillus 1 tab twice a day.

4.  Pantoprazole 40 mg daily.

5.  Cetirizine 10 mg daily.

6.  Escitalopram 10 mg daily.

7.  Fish oil 1000 mg softgel daily.

8.  Klonopin 0.5 mg daily as needed for anxiety.

9.  Multivitamin with minerals 1 dose daily.

10.  Naproxen 250 mg as needed.

11.  Oxcarbazepine 150 mg 3 times a day.

12.  Trazodone 50 mg b.i.d.

 

DISCHARGE FOLLOWUP:  The patient is to follow up with her primary care provider 
Dr. Kalyan Mendez within a week.

 

The patient is to ask the [facility] for a GI referral.

 

The patient to return to the emergency room for fevers, chills, worsening rash, 
or if any new symptoms occur.

 

The patient is to follow up with the wound clinic regarding her left wound, 
referral will be made by the [facility].

 

 [id number]/CPS #: [id number]

MTDD